# Patient Record
Sex: MALE | Race: WHITE | NOT HISPANIC OR LATINO | URBAN - METROPOLITAN AREA
[De-identification: names, ages, dates, MRNs, and addresses within clinical notes are randomized per-mention and may not be internally consistent; named-entity substitution may affect disease eponyms.]

---

## 2017-01-09 ENCOUNTER — OFFICE (OUTPATIENT)
Dept: URBAN - METROPOLITAN AREA CLINIC 64 | Facility: CLINIC | Age: 47
End: 2017-01-09

## 2017-01-09 VITALS
DIASTOLIC BLOOD PRESSURE: 94 MMHG | HEIGHT: 75 IN | SYSTOLIC BLOOD PRESSURE: 152 MMHG | WEIGHT: 261 LBS | HEART RATE: 66 BPM

## 2017-01-09 DIAGNOSIS — K86.1 OTHER CHRONIC PANCREATITIS: ICD-10-CM

## 2017-01-09 DIAGNOSIS — K85.90 ACUTE PANCREATITIS WITHOUT NECROSIS OR INFECTION, UNSPECIFIE: ICD-10-CM

## 2017-01-09 DIAGNOSIS — F10.10 ALCOHOL ABUSE, UNCOMPLICATED: ICD-10-CM

## 2017-01-09 DIAGNOSIS — R10.12 LEFT UPPER QUADRANT PAIN: ICD-10-CM

## 2017-01-09 DIAGNOSIS — R11.2 NAUSEA WITH VOMITING, UNSPECIFIED: ICD-10-CM

## 2017-01-09 DIAGNOSIS — K86.3 PSEUDOCYST OF PANCREAS: ICD-10-CM

## 2017-01-09 LAB
AMYLASE, SERUM: 53 U/L (ref 31–124)
C-REACTIVE PROTEIN, QUANT: 7 MG/L — HIGH (ref 0–4.9)
CBC WITH DIFFERENTIAL/PLATELET: BASO (ABSOLUTE): 0 X10E3/UL (ref 0–0.2)
CBC WITH DIFFERENTIAL/PLATELET: BASOS: 0 %
CBC WITH DIFFERENTIAL/PLATELET: EOS (ABSOLUTE): 0.2 X10E3/UL (ref 0–0.4)
CBC WITH DIFFERENTIAL/PLATELET: EOS: 2 %
CBC WITH DIFFERENTIAL/PLATELET: HEMATOCRIT: 40.8 % (ref 37.5–51)
CBC WITH DIFFERENTIAL/PLATELET: HEMATOLOGY COMMENTS: (no result)
CBC WITH DIFFERENTIAL/PLATELET: HEMOGLOBIN: 13.8 G/DL (ref 12.6–17.7)
CBC WITH DIFFERENTIAL/PLATELET: IMMATURE CELLS: (no result)
CBC WITH DIFFERENTIAL/PLATELET: IMMATURE GRANS (ABS): 0 X10E3/UL (ref 0–0.1)
CBC WITH DIFFERENTIAL/PLATELET: IMMATURE GRANULOCYTES: 0 %
CBC WITH DIFFERENTIAL/PLATELET: LYMPHS (ABSOLUTE): 3.3 X10E3/UL — HIGH (ref 0.7–3.1)
CBC WITH DIFFERENTIAL/PLATELET: LYMPHS: 31 %
CBC WITH DIFFERENTIAL/PLATELET: MCH: 33.3 PG — HIGH (ref 26.6–33)
CBC WITH DIFFERENTIAL/PLATELET: MCHC: 33.8 G/DL (ref 31.5–35.7)
CBC WITH DIFFERENTIAL/PLATELET: MCV: 98 FL — HIGH (ref 79–97)
CBC WITH DIFFERENTIAL/PLATELET: MONOCYTES(ABSOLUTE): 0.5 X10E3/UL (ref 0.1–0.9)
CBC WITH DIFFERENTIAL/PLATELET: MONOCYTES: 5 %
CBC WITH DIFFERENTIAL/PLATELET: NEUTROPHILS (ABSOLUTE): 6.4 X10E3/UL (ref 1.4–7)
CBC WITH DIFFERENTIAL/PLATELET: NEUTROPHILS: 62 %
CBC WITH DIFFERENTIAL/PLATELET: NRBC: (no result)
CBC WITH DIFFERENTIAL/PLATELET: PLATELETS: 271 X10E3/UL (ref 150–379)
CBC WITH DIFFERENTIAL/PLATELET: RBC: 4.15 X10E6/UL (ref 4.14–5.8)
CBC WITH DIFFERENTIAL/PLATELET: RDW: 15.8 % — HIGH (ref 12.3–15.4)
CBC WITH DIFFERENTIAL/PLATELET: WBC: 10.5 X10E3/UL (ref 3.4–10.8)
COMP. METABOLIC PANEL (14): A/G RATIO: 1.6 (ref 1.1–2.5)
COMP. METABOLIC PANEL (14): ALBUMIN, SERUM: 4.9 G/DL (ref 3.5–5.5)
COMP. METABOLIC PANEL (14): ALKALINE PHOSPHATASE, S: 63 IU/L (ref 39–117)
COMP. METABOLIC PANEL (14): ALT (SGPT): 47 IU/L — HIGH (ref 0–44)
COMP. METABOLIC PANEL (14): AST (SGOT): 119 IU/L — HIGH (ref 0–40)
COMP. METABOLIC PANEL (14): BILIRUBIN, TOTAL: <0.2 MG/DL
COMP. METABOLIC PANEL (14): BUN/CREATININE RATIO: 9 (ref 9–20)
COMP. METABOLIC PANEL (14): BUN: 9 MG/DL (ref 6–24)
COMP. METABOLIC PANEL (14): CALCIUM, SERUM: 9.7 MG/DL (ref 8.7–10.2)
COMP. METABOLIC PANEL (14): CARBON DIOXIDE, TOTAL: 24 MMOL/L (ref 18–29)
COMP. METABOLIC PANEL (14): CHLORIDE, SERUM: 97 MMOL/L (ref 96–106)
COMP. METABOLIC PANEL (14): CREATININE, SERUM: 1.03 MG/DL (ref 0.76–1.27)
COMP. METABOLIC PANEL (14): EGFR IF AFRICN AM: 100 ML/MIN/1.73 (ref 59–?)
COMP. METABOLIC PANEL (14): EGFR IF NONAFRICN AM: 87 ML/MIN/1.73 (ref 59–?)
COMP. METABOLIC PANEL (14): GLOBULIN, TOTAL: 3.1 G/DL (ref 1.5–4.5)
COMP. METABOLIC PANEL (14): GLUCOSE, SERUM: 92 MG/DL (ref 65–99)
COMP. METABOLIC PANEL (14): POTASSIUM, SERUM: 4.4 MMOL/L (ref 3.5–5.2)
COMP. METABOLIC PANEL (14): PROTEIN, TOTAL, SERUM: 8 G/DL (ref 6–8.5)
COMP. METABOLIC PANEL (14): SODIUM, SERUM: 140 MMOL/L (ref 134–144)
LIPASE, SERUM: 31 U/L (ref 0–59)

## 2017-01-09 PROCEDURE — 99213 OFFICE O/P EST LOW 20 MIN: CPT | Performed by: INTERNAL MEDICINE

## 2017-01-09 RX ORDER — DEXLANSOPRAZOLE 60 MG/1
CAPSULE, DELAYED RELEASE ORAL
Qty: 90 | Refills: 11 | Status: ACTIVE
Start: 2016-09-12

## 2017-01-09 RX ORDER — PROMETHAZINE HYDROCHLORIDE 25 MG/1
TABLET ORAL
Qty: 60 | Refills: 1 | Status: COMPLETED
Start: 2016-03-04 | End: 2020-07-02

## 2017-01-09 RX ORDER — TRAMADOL HYDROCHLORIDE 50 MG/1
200 TABLET ORAL
Qty: 30 | Refills: 0 | Status: COMPLETED
Start: 2015-11-23 | End: 2020-07-02

## 2017-01-09 RX ORDER — PANCRELIPASE 36000; 180000; 114000 [USP'U]/1; [USP'U]/1; [USP'U]/1
CAPSULE, DELAYED RELEASE PELLETS ORAL
Qty: 90 | Refills: 6 | Status: COMPLETED
Start: 2016-07-01 | End: 2020-07-02

## 2017-04-21 ENCOUNTER — OFFICE (OUTPATIENT)
Dept: URBAN - METROPOLITAN AREA CLINIC 64 | Facility: CLINIC | Age: 47
End: 2017-04-21

## 2017-04-21 VITALS
HEART RATE: 77 BPM | HEIGHT: 75 IN | DIASTOLIC BLOOD PRESSURE: 93 MMHG | SYSTOLIC BLOOD PRESSURE: 132 MMHG | WEIGHT: 256 LBS

## 2017-04-21 DIAGNOSIS — K86.1 OTHER CHRONIC PANCREATITIS: ICD-10-CM

## 2017-04-21 DIAGNOSIS — R11.2 NAUSEA WITH VOMITING, UNSPECIFIED: ICD-10-CM

## 2017-04-21 DIAGNOSIS — K60.2 ANAL FISSURE, UNSPECIFIED: ICD-10-CM

## 2017-04-21 PROCEDURE — 99213 OFFICE O/P EST LOW 20 MIN: CPT | Performed by: NURSE PRACTITIONER

## 2017-04-21 RX ORDER — TRAMADOL HYDROCHLORIDE 50 MG/1
200 TABLET ORAL
Qty: 30 | Refills: 0 | Status: COMPLETED
Start: 2015-11-23 | End: 2020-07-02

## 2017-04-21 RX ORDER — PANTOPRAZOLE SODIUM 40 MG/1
40 TABLET, DELAYED RELEASE ORAL
Qty: 90 | Refills: 4 | Status: COMPLETED
Start: 2017-01-09 | End: 2020-07-02

## 2017-05-30 ENCOUNTER — HOSPITAL ENCOUNTER (OUTPATIENT)
Dept: OTHER | Facility: HOSPITAL | Age: 47
Discharge: HOME | End: 2017-05-30
Attending: ORTHOPAEDIC SURGERY | Admitting: ORTHOPAEDIC SURGERY

## 2017-05-30 LAB
ANION GAP SERPL CALC-SCNC: 14.4 MMOL/L (ref 10–20)
BUN SERPL-MCNC: 16 MG/DL (ref 8–20)
BUN/CREAT SERPL: 12.3 (ref 6.2–20.3)
CALCIUM SERPL-MCNC: 9.6 MG/DL (ref 8.9–10.3)
CHLORIDE SERPL-SCNC: 101 MMOL/L (ref 101–111)
CONV CO2: 28 MMOL/L (ref 22–32)
CREAT UR-MCNC: 1.3 MG/DL (ref 0.7–1.2)
GLUCOSE SERPL-MCNC: 89 MG/DL (ref 65–99)
POTASSIUM SERPL-SCNC: 4.4 MMOL/L (ref 3.6–5.1)
SODIUM SERPL-SCNC: 139 MMOL/L (ref 136–144)

## 2017-08-14 ENCOUNTER — INPATIENT HOSPITAL (OUTPATIENT)
Dept: URBAN - METROPOLITAN AREA HOSPITAL 84 | Facility: HOSPITAL | Age: 47
End: 2017-08-14

## 2017-08-14 DIAGNOSIS — K86.1 OTHER CHRONIC PANCREATITIS: ICD-10-CM

## 2017-08-14 DIAGNOSIS — R94.5 ABNORMAL RESULTS OF LIVER FUNCTION STUDIES: ICD-10-CM

## 2017-08-14 DIAGNOSIS — D72.829 ELEVATED WHITE BLOOD CELL COUNT, UNSPECIFIED: ICD-10-CM

## 2017-08-14 PROCEDURE — 99223 1ST HOSP IP/OBS HIGH 75: CPT | Performed by: NURSE PRACTITIONER

## 2017-08-15 ENCOUNTER — INPATIENT HOSPITAL (OUTPATIENT)
Dept: URBAN - METROPOLITAN AREA HOSPITAL 84 | Facility: HOSPITAL | Age: 47
End: 2017-08-15

## 2017-08-15 DIAGNOSIS — K59.00 CONSTIPATION, UNSPECIFIED: ICD-10-CM

## 2017-08-15 DIAGNOSIS — K86.1 OTHER CHRONIC PANCREATITIS: ICD-10-CM

## 2017-08-15 PROCEDURE — 99231 SBSQ HOSP IP/OBS SF/LOW 25: CPT | Performed by: NURSE PRACTITIONER

## 2019-08-14 ENCOUNTER — HOSPITAL ENCOUNTER (EMERGENCY)
Facility: HOSPITAL | Age: 49
Discharge: HOME | End: 2019-08-14
Attending: EMERGENCY MEDICINE | Admitting: EMERGENCY MEDICINE

## 2019-08-14 VITALS
HEIGHT: 74 IN | BODY MASS INDEX: 28.35 KG/M2 | HEART RATE: 59 BPM | RESPIRATION RATE: 16 BRPM | WEIGHT: 220.9 LBS | TEMPERATURE: 97.3 F | SYSTOLIC BLOOD PRESSURE: 109 MMHG | OXYGEN SATURATION: 99 % | DIASTOLIC BLOOD PRESSURE: 65 MMHG

## 2019-08-14 DIAGNOSIS — R10.9 ABDOMINAL PAIN, UNSPECIFIED ABDOMINAL LOCATION: Primary | ICD-10-CM

## 2019-08-14 LAB
ALBUMIN SERPL-MCNC: 3.8 G/DL (ref 3.5–4.8)
ALBUMIN/GLOB SERPL: 1.2 G/DL (ref 1–1.7)
ALP SERPL-CCNC: 48 U/L (ref 32–91)
ALT SERPL W P-5'-P-CCNC: 22 U/L (ref 17–63)
ANION GAP SERPL CALCULATED.3IONS-SCNC: 14.7 MMOL/L (ref 5–15)
AST SERPL-CCNC: 41 U/L (ref 15–41)
BACTERIA UR QL AUTO: ABNORMAL /HPF
BASOPHILS # BLD AUTO: 0.1 10*3/MM3 (ref 0–0.2)
BASOPHILS NFR BLD AUTO: 1.4 % (ref 0–1.5)
BILIRUB SERPL-MCNC: 0.8 MG/DL (ref 0.3–1.2)
BILIRUB UR QL STRIP: NEGATIVE
BUN BLD-MCNC: 12 MG/DL (ref 8–20)
BUN/CREAT SERPL: 10.9 (ref 6.2–20.3)
CALCIUM SPEC-SCNC: 8.6 MG/DL (ref 8.9–10.3)
CHLORIDE SERPL-SCNC: 105 MMOL/L (ref 101–111)
CLARITY UR: CLEAR
CO2 SERPL-SCNC: 22 MMOL/L (ref 22–32)
COLOR UR: YELLOW
CREAT BLD-MCNC: 1.1 MG/DL (ref 0.7–1.2)
DEPRECATED RDW RBC AUTO: 54.3 FL (ref 37–54)
EOSINOPHIL # BLD AUTO: 0.3 10*3/MM3 (ref 0–0.4)
EOSINOPHIL NFR BLD AUTO: 3.1 % (ref 0.3–6.2)
ERYTHROCYTE [DISTWIDTH] IN BLOOD BY AUTOMATED COUNT: 15.5 % (ref 12.3–15.4)
GFR SERPL CREATININE-BSD FRML MDRD: 71 ML/MIN/1.73
GLOBULIN UR ELPH-MCNC: 3.1 GM/DL (ref 2.5–3.8)
GLUCOSE BLD-MCNC: 95 MG/DL (ref 65–99)
GLUCOSE UR STRIP-MCNC: NEGATIVE MG/DL
HCT VFR BLD AUTO: 43.2 % (ref 37.5–51)
HGB BLD-MCNC: 14.1 G/DL (ref 13–17.7)
HGB UR QL STRIP.AUTO: ABNORMAL
HYALINE CASTS UR QL AUTO: ABNORMAL /LPF
KETONES UR QL STRIP: NEGATIVE
LEUKOCYTE ESTERASE UR QL STRIP.AUTO: NEGATIVE
LIPASE SERPL-CCNC: 34 U/L (ref 22–51)
LYMPHOCYTES # BLD AUTO: 1.5 10*3/MM3 (ref 0.7–3.1)
LYMPHOCYTES NFR BLD AUTO: 18.7 % (ref 19.6–45.3)
MCH RBC QN AUTO: 32.7 PG (ref 26.6–33)
MCHC RBC AUTO-ENTMCNC: 32.6 G/DL (ref 31.5–35.7)
MCV RBC AUTO: 100.4 FL (ref 79–97)
MONOCYTES # BLD AUTO: 0.5 10*3/MM3 (ref 0.1–0.9)
MONOCYTES NFR BLD AUTO: 6.3 % (ref 5–12)
NEUTROPHILS # BLD AUTO: 5.7 10*3/MM3 (ref 1.7–7)
NEUTROPHILS NFR BLD AUTO: 70.5 % (ref 42.7–76)
NITRITE UR QL STRIP: NEGATIVE
NRBC BLD AUTO-RTO: 0.1 /100 WBC (ref 0–0.2)
PH UR STRIP.AUTO: 6 [PH] (ref 5–8)
PLATELET # BLD AUTO: 214 10*3/MM3 (ref 140–450)
PMV BLD AUTO: 9.1 FL (ref 6–12)
POTASSIUM BLD-SCNC: 4.7 MMOL/L (ref 3.6–5.1)
PROT SERPL-MCNC: 6.9 G/DL (ref 6.1–7.9)
PROT UR QL STRIP: NEGATIVE
RBC # BLD AUTO: 4.3 10*6/MM3 (ref 4.14–5.8)
RBC # UR: ABNORMAL /HPF
REF LAB TEST METHOD: ABNORMAL
SODIUM BLD-SCNC: 137 MMOL/L (ref 136–144)
SP GR UR STRIP: 1.01 (ref 1–1.03)
SQUAMOUS #/AREA URNS HPF: ABNORMAL /HPF
UROBILINOGEN UR QL STRIP: ABNORMAL
WBC NRBC COR # BLD: 8.2 10*3/MM3 (ref 3.4–10.8)
WBC UR QL AUTO: ABNORMAL /HPF

## 2019-08-14 PROCEDURE — 96374 THER/PROPH/DIAG INJ IV PUSH: CPT

## 2019-08-14 PROCEDURE — 25010000002 HYDROMORPHONE PER 4 MG: Performed by: EMERGENCY MEDICINE

## 2019-08-14 PROCEDURE — 80053 COMPREHEN METABOLIC PANEL: CPT | Performed by: EMERGENCY MEDICINE

## 2019-08-14 PROCEDURE — 99283 EMERGENCY DEPT VISIT LOW MDM: CPT

## 2019-08-14 PROCEDURE — 25010000002 ONDANSETRON PER 1 MG: Performed by: EMERGENCY MEDICINE

## 2019-08-14 PROCEDURE — 81001 URINALYSIS AUTO W/SCOPE: CPT | Performed by: EMERGENCY MEDICINE

## 2019-08-14 PROCEDURE — 83690 ASSAY OF LIPASE: CPT | Performed by: EMERGENCY MEDICINE

## 2019-08-14 PROCEDURE — 85025 COMPLETE CBC W/AUTO DIFF WBC: CPT | Performed by: EMERGENCY MEDICINE

## 2019-08-14 PROCEDURE — 96375 TX/PRO/DX INJ NEW DRUG ADDON: CPT

## 2019-08-14 RX ORDER — SODIUM CHLORIDE 0.9 % (FLUSH) 0.9 %
10 SYRINGE (ML) INJECTION AS NEEDED
Status: DISCONTINUED | OUTPATIENT
Start: 2019-08-14 | End: 2019-08-14 | Stop reason: HOSPADM

## 2019-08-14 RX ORDER — ONDANSETRON 2 MG/ML
4 INJECTION INTRAMUSCULAR; INTRAVENOUS ONCE
Status: COMPLETED | OUTPATIENT
Start: 2019-08-14 | End: 2019-08-14

## 2019-08-14 RX ORDER — HYDROMORPHONE HCL 110MG/55ML
0.5 PATIENT CONTROLLED ANALGESIA SYRINGE INTRAVENOUS ONCE
Status: COMPLETED | OUTPATIENT
Start: 2019-08-14 | End: 2019-08-14

## 2019-08-14 RX ADMIN — SODIUM CHLORIDE 1000 ML: 900 INJECTION, SOLUTION INTRAVENOUS at 12:02

## 2019-08-14 RX ADMIN — HYDROMORPHONE HYDROCHLORIDE 0.5 MG: 2 INJECTION, SOLUTION INTRAMUSCULAR; INTRAVENOUS; SUBCUTANEOUS at 12:02

## 2019-08-14 RX ADMIN — ONDANSETRON 4 MG: 2 INJECTION INTRAMUSCULAR; INTRAVENOUS at 12:02

## 2019-08-14 NOTE — DISCHARGE INSTRUCTIONS
Return for increased pain fever vomiting, shortness of air or any other concerns.  Follow-up with your doctor this week for reexamination

## 2019-08-14 NOTE — ED PROVIDER NOTES
"Subjective   History of Present Illness  Abdominal pain  49-year-old male states he felt nauseated and had some abdominal pain and muscle aches today.  He reports no fevers chills or unusual ingestions.  He states he has a history of chronic pancreatitis and was seen at [facility] yesterday.  Pain of moderate intensity without relieving or exacerbating factors described to be in the mid abdomen.  Review of Systems   Constitutional: Negative.    HENT: Negative.    Eyes: Negative.    Respiratory: Negative.    Cardiovascular: Negative.    Gastrointestinal: Positive for abdominal pain, nausea and vomiting. Negative for diarrhea.   Endocrine: Negative.    Genitourinary: Negative.    Musculoskeletal: Positive for myalgias.   Skin: Negative.    Neurological: Negative.    Psychiatric/Behavioral: Negative.        Past Medical History:   Diagnosis Date   • History of anal fissures    • Pancreatitis        Allergies   Allergen Reactions   • Penicillins Anaphylaxis       Past Surgical History:   Procedure Laterality Date   • KNEE ACL RECONSTRUCTION     • NECK SURGERY         History reviewed. No pertinent family history.    Social History     Socioeconomic History   • Marital status:      Spouse name: Not on file   • Number of children: Not on file   • Years of education: Not on file   • Highest education level: Not on file   Tobacco Use   • Smoking status: Current Every Day Smoker   • Smokeless tobacco: Never Used   Substance and Sexual Activity   • Alcohol use: No     Frequency: Never     Comment: Previous Daily/Binge drinker   • Drug use: No   • Sexual activity: Defer       /88 (BP Location: Left arm, Patient Position: Sitting)   Pulse 62   Temp 97.3 °F (36.3 °C) (Oral)   Resp 19   Ht 188 cm (74\")   Wt 100 kg (220 lb 14.4 oz)   SpO2 99%   BMI 28.36 kg/m²       Objective   Physical Exam  General: Well-developed well-appearing, no acute distress, alert and appropriate  Eyes: Pupils round and reactive, " sclera nonicteric  HEENT: Mucous membranes moist, no mucosal swelling  Neck: Supple, no nuchal rigidity, no lymphadenopathy  Respirations: Respirations nonlabored, equal breath sounds bilaterally, clear lungs  Heart regular rate and rhythm, no murmurs rubs or gallops,   Abdomen soft mildly tender palpation mid abdomen, no rebound or guarding, nondistended, no hepatosplenomegaly, no hernia, no mass, normal bowel sounds, no CVA tenderness  Extremities no clubbing cyanosis or edema, calves are symmetric and nontender  Neuro cranial nerves grossly intact, no focal limb deficits  Psych oriented, pleasant affect  Skin no rash, brisk cap refill  Procedures           ED Course      Results for orders placed or performed during the hospital encounter of 08/14/19   Comprehensive Metabolic Panel   Result Value Ref Range    Glucose 95 65 - 99 mg/dL    BUN 12 8 - 20 mg/dL    Creatinine 1.10 0.70 - 1.20 mg/dL    Sodium 137 136 - 144 mmol/L    Potassium 4.7 3.6 - 5.1 mmol/L    Chloride 105 101 - 111 mmol/L    CO2 22.0 22.0 - 32.0 mmol/L    Calcium 8.6 (L) 8.9 - 10.3 mg/dL    Total Protein 6.9 6.1 - 7.9 g/dL    Albumin 3.80 3.50 - 4.80 g/dL    ALT (SGPT) 22 17 - 63 U/L    AST (SGOT) 41 15 - 41 U/L    Alkaline Phosphatase 48 32 - 91 U/L    Total Bilirubin 0.8 0.3 - 1.2 mg/dL    eGFR Non African Amer 71 >60 mL/min/1.73    Globulin 3.1 2.5 - 3.8 gm/dL    A/G Ratio 1.2 1.0 - 1.7 g/dL    BUN/Creatinine Ratio 10.9 6.2 - 20.3    Anion Gap 14.7 5.0 - 15.0 mmol/L   Lipase   Result Value Ref Range    Lipase 34 22 - 51 U/L   Urinalysis With Culture If Indicated - Urine, Clean Catch   Result Value Ref Range    Color, UA Yellow Yellow, Straw    Appearance, UA Clear Clear    pH, UA 6.0 5.0 - 8.0    Specific Gravity, UA 1.012 1.005 - 1.030    Glucose, UA Negative Negative    Ketones, UA Negative Negative    Bilirubin, UA Negative Negative    Blood, UA Small (1+) (A) Negative    Protein, UA Negative Negative    Leuk Esterase, UA Negative Negative     Nitrite, UA Negative Negative    Urobilinogen, UA 0.2 E.U./dL 0.2 - 1.0 E.U./dL   CBC Auto Differential   Result Value Ref Range    WBC 8.20 3.40 - 10.80 10*3/mm3    RBC 4.30 4.14 - 5.80 10*6/mm3    Hemoglobin 14.1 13.0 - 17.7 g/dL    Hematocrit 43.2 37.5 - 51.0 %    .4 (H) 79.0 - 97.0 fL    MCH 32.7 26.6 - 33.0 pg    MCHC 32.6 31.5 - 35.7 g/dL    RDW 15.5 (H) 12.3 - 15.4 %    RDW-SD 54.3 (H) 37.0 - 54.0 fl    MPV 9.1 6.0 - 12.0 fL    Platelets 214 140 - 450 10*3/mm3    Neutrophil % 70.5 42.7 - 76.0 %    Lymphocyte % 18.7 (L) 19.6 - 45.3 %    Monocyte % 6.3 5.0 - 12.0 %    Eosinophil % 3.1 0.3 - 6.2 %    Basophil % 1.4 0.0 - 1.5 %    Neutrophils, Absolute 5.70 1.70 - 7.00 10*3/mm3    Lymphocytes, Absolute 1.50 0.70 - 3.10 10*3/mm3    Monocytes, Absolute 0.50 0.10 - 0.90 10*3/mm3    Eosinophils, Absolute 0.30 0.00 - 0.40 10*3/mm3    Basophils, Absolute 0.10 0.00 - 0.20 10*3/mm3    nRBC 0.1 0.0 - 0.2 /100 WBC   Urinalysis, Microscopic Only - Urine, Clean Catch   Result Value Ref Range    RBC, UA 0-2 (A) None Seen /HPF    WBC, UA None Seen None Seen /HPF    Bacteria, UA None Seen None Seen /HPF    Squamous Epithelial Cells, UA None Seen None Seen, 0-2 /HPF    Hyaline Casts, UA None Seen None Seen /LPF    Methodology Automated Microscopy                  MDM  Patient presents with nonspecific abdominal pain with a history of chronic pink otitis.  He has benign abdominal examination with no signs of peritonitis or acute abdomen.  Laboratory evaluation was essentially normal.  Patient was given pain nausea medication IV fluids and was resting comfortably reexamination.  He is discharged in good condition and given warning signs for return.    Final diagnoses:   Abdominal pain, unspecified abdominal location            Cody Davenport MD  08/14/19 1153       Cody Davenport MD  08/14/19 1305

## 2019-08-14 NOTE — ED NOTES
Pt c/o stomach feeling bloated starting yesterday. Pt was told it was pancreatitis by [facility] yesterday with prescription for Zofran. This morning pt states he had 2 bowel movements and feels he tore rectum due to significant amount of blood in stool. Pt has hx of fissures in rectum. Pt also c/o feeling lethargic and dizzy since this morning.      Prior, BONITA Perez  08/14/19 3357

## 2019-08-19 ENCOUNTER — ON CAMPUS - OUTPATIENT (OUTPATIENT)
Dept: URBAN - METROPOLITAN AREA HOSPITAL 77 | Facility: HOSPITAL | Age: 49
End: 2019-08-19
Payer: COMMERCIAL

## 2019-08-19 DIAGNOSIS — K29.80 DUODENITIS WITHOUT BLEEDING: ICD-10-CM

## 2019-08-19 DIAGNOSIS — R93.3 ABNORMAL FINDINGS ON DIAGNOSTIC IMAGING OF OTHER PARTS OF DI: ICD-10-CM

## 2019-08-19 DIAGNOSIS — R13.10 DYSPHAGIA, UNSPECIFIED: ICD-10-CM

## 2019-08-19 DIAGNOSIS — K20.8 OTHER ESOPHAGITIS: ICD-10-CM

## 2019-08-19 DIAGNOSIS — R10.13 EPIGASTRIC PAIN: ICD-10-CM

## 2019-08-19 DIAGNOSIS — K31.89 OTHER DISEASES OF STOMACH AND DUODENUM: ICD-10-CM

## 2019-08-19 DIAGNOSIS — K29.50 UNSPECIFIED CHRONIC GASTRITIS WITHOUT BLEEDING: ICD-10-CM

## 2019-08-19 PROCEDURE — 43450 DILATE ESOPHAGUS 1/MULT PASS: CPT | Performed by: INTERNAL MEDICINE

## 2019-08-19 PROCEDURE — 43239 EGD BIOPSY SINGLE/MULTIPLE: CPT | Performed by: INTERNAL MEDICINE

## 2019-08-20 ENCOUNTER — ON CAMPUS - OUTPATIENT (OUTPATIENT)
Dept: URBAN - METROPOLITAN AREA HOSPITAL 77 | Facility: HOSPITAL | Age: 49
End: 2019-08-20

## 2019-08-20 DIAGNOSIS — R10.13 EPIGASTRIC PAIN: ICD-10-CM

## 2019-08-20 DIAGNOSIS — R74.8 ABNORMAL LEVELS OF OTHER SERUM ENZYMES: ICD-10-CM

## 2019-08-20 DIAGNOSIS — K29.80 DUODENITIS WITHOUT BLEEDING: ICD-10-CM

## 2019-08-20 DIAGNOSIS — K20.8 OTHER ESOPHAGITIS: ICD-10-CM

## 2019-08-20 DIAGNOSIS — K86.1 OTHER CHRONIC PANCREATITIS: ICD-10-CM

## 2019-08-20 DIAGNOSIS — R11.0 NAUSEA: ICD-10-CM

## 2019-08-20 DIAGNOSIS — K29.50 UNSPECIFIED CHRONIC GASTRITIS WITHOUT BLEEDING: ICD-10-CM

## 2019-08-20 DIAGNOSIS — R13.10 DYSPHAGIA, UNSPECIFIED: ICD-10-CM

## 2019-08-20 PROCEDURE — 99212 OFFICE O/P EST SF 10 MIN: CPT | Performed by: NURSE PRACTITIONER

## 2019-09-30 ENCOUNTER — ON CAMPUS - OUTPATIENT (OUTPATIENT)
Dept: URBAN - METROPOLITAN AREA HOSPITAL 77 | Facility: HOSPITAL | Age: 49
End: 2019-09-30
Payer: COMMERCIAL

## 2019-09-30 DIAGNOSIS — F10.188 ALCOHOL ABUSE WITH OTHER ALCOHOL-INDUCED DISORDER: ICD-10-CM

## 2019-09-30 DIAGNOSIS — K86.0 ALCOHOL-INDUCED CHRONIC PANCREATITIS: ICD-10-CM

## 2019-09-30 DIAGNOSIS — R94.5 ABNORMAL RESULTS OF LIVER FUNCTION STUDIES: ICD-10-CM

## 2019-09-30 DIAGNOSIS — K31.89 OTHER DISEASES OF STOMACH AND DUODENUM: ICD-10-CM

## 2019-09-30 PROCEDURE — 43238 EGD US FINE NEEDLE BX/ASPIR: CPT | Performed by: INTERNAL MEDICINE

## 2019-10-08 ENCOUNTER — HOSPITAL ENCOUNTER (OUTPATIENT)
Facility: HOSPITAL | Age: 49
Setting detail: OBSERVATION
Discharge: HOME | End: 2019-10-09
Attending: INTERNAL MEDICINE | Admitting: HOSPITALIST

## 2019-10-08 ENCOUNTER — APPOINTMENT (OUTPATIENT)
Dept: CT IMAGING | Facility: HOSPITAL | Age: 49
End: 2019-10-08

## 2019-10-08 DIAGNOSIS — R10.10 PAIN OF UPPER ABDOMEN: Primary | ICD-10-CM

## 2019-10-08 DIAGNOSIS — G89.29 CHRONIC CHEST PAIN: ICD-10-CM

## 2019-10-08 DIAGNOSIS — K92.1 HEMATOCHEZIA: ICD-10-CM

## 2019-10-08 DIAGNOSIS — R11.0 NAUSEA: ICD-10-CM

## 2019-10-08 DIAGNOSIS — R06.09 CHRONIC DYSPNEA: ICD-10-CM

## 2019-10-08 DIAGNOSIS — R07.9 CHRONIC CHEST PAIN: ICD-10-CM

## 2019-10-08 LAB
ALBUMIN SERPL-MCNC: 3.8 G/DL (ref 3.5–4.8)
ALBUMIN/GLOB SERPL: 1.2 G/DL (ref 1–1.7)
ALP SERPL-CCNC: 54 U/L (ref 32–91)
ALT SERPL W P-5'-P-CCNC: 14 U/L (ref 17–63)
ANION GAP SERPL CALCULATED.3IONS-SCNC: 13.9 MMOL/L (ref 5–15)
AST SERPL-CCNC: 20 U/L (ref 15–41)
BASOPHILS # BLD AUTO: 0.2 10*3/MM3 (ref 0–0.2)
BASOPHILS NFR BLD AUTO: 1.5 % (ref 0–1.5)
BILIRUB SERPL-MCNC: 0.5 MG/DL (ref 0.3–1.2)
BUN BLD-MCNC: 12 MG/DL (ref 8–20)
BUN/CREAT SERPL: 15 (ref 6.2–20.3)
CALCIUM SPEC-SCNC: 8.8 MG/DL (ref 8.9–10.3)
CHLORIDE SERPL-SCNC: 104 MMOL/L (ref 101–111)
CO2 SERPL-SCNC: 24 MMOL/L (ref 22–32)
CREAT BLD-MCNC: 0.8 MG/DL (ref 0.7–1.2)
DEPRECATED RDW RBC AUTO: 47.3 FL (ref 37–54)
EOSINOPHIL # BLD AUTO: 0.2 10*3/MM3 (ref 0–0.4)
EOSINOPHIL NFR BLD AUTO: 2.4 % (ref 0.3–6.2)
ERYTHROCYTE [DISTWIDTH] IN BLOOD BY AUTOMATED COUNT: 13.8 % (ref 12.3–15.4)
GFR SERPL CREATININE-BSD FRML MDRD: 103 ML/MIN/1.73
GLOBULIN UR ELPH-MCNC: 3.1 GM/DL (ref 2.5–3.8)
GLUCOSE BLD-MCNC: 114 MG/DL (ref 65–99)
HCT VFR BLD AUTO: 39.7 % (ref 37.5–51)
HGB BLD-MCNC: 13.8 G/DL (ref 13–17.7)
LIPASE SERPL-CCNC: 39 U/L (ref 22–51)
LYMPHOCYTES # BLD AUTO: 3.2 10*3/MM3 (ref 0.7–3.1)
LYMPHOCYTES NFR BLD AUTO: 31.6 % (ref 19.6–45.3)
MCH RBC QN AUTO: 34.2 PG (ref 26.6–33)
MCHC RBC AUTO-ENTMCNC: 34.9 G/DL (ref 31.5–35.7)
MCV RBC AUTO: 98 FL (ref 79–97)
MONOCYTES # BLD AUTO: 0.6 10*3/MM3 (ref 0.1–0.9)
MONOCYTES NFR BLD AUTO: 6 % (ref 5–12)
NEUTROPHILS # BLD AUTO: 6 10*3/MM3 (ref 1.7–7)
NEUTROPHILS NFR BLD AUTO: 58.5 % (ref 42.7–76)
NRBC BLD AUTO-RTO: 0.1 /100 WBC (ref 0–0.2)
PLATELET # BLD AUTO: 252 10*3/MM3 (ref 140–450)
PMV BLD AUTO: 8.3 FL (ref 6–12)
POTASSIUM BLD-SCNC: 3.9 MMOL/L (ref 3.6–5.1)
PROT SERPL-MCNC: 6.9 G/DL (ref 6.1–7.9)
RBC # BLD AUTO: 4.05 10*6/MM3 (ref 4.14–5.8)
SODIUM BLD-SCNC: 138 MMOL/L (ref 136–144)
WBC NRBC COR # BLD: 10.3 10*3/MM3 (ref 3.4–10.8)

## 2019-10-08 PROCEDURE — 96376 TX/PRO/DX INJ SAME DRUG ADON: CPT

## 2019-10-08 PROCEDURE — 80053 COMPREHEN METABOLIC PANEL: CPT | Performed by: NURSE PRACTITIONER

## 2019-10-08 PROCEDURE — 83690 ASSAY OF LIPASE: CPT | Performed by: NURSE PRACTITIONER

## 2019-10-08 PROCEDURE — 96375 TX/PRO/DX INJ NEW DRUG ADDON: CPT

## 2019-10-08 PROCEDURE — 96374 THER/PROPH/DIAG INJ IV PUSH: CPT

## 2019-10-08 PROCEDURE — 85025 COMPLETE CBC W/AUTO DIFF WBC: CPT | Performed by: NURSE PRACTITIONER

## 2019-10-08 PROCEDURE — 0 IOPAMIDOL PER 1 ML: Performed by: NURSE PRACTITIONER

## 2019-10-08 PROCEDURE — 25010000002 ONDANSETRON PER 1 MG: Performed by: NURSE PRACTITIONER

## 2019-10-08 PROCEDURE — 93005 ELECTROCARDIOGRAM TRACING: CPT | Performed by: NURSE PRACTITIONER

## 2019-10-08 PROCEDURE — 25010000002 HYDROMORPHONE PER 4 MG: Performed by: NURSE PRACTITIONER

## 2019-10-08 PROCEDURE — 25010000002 HYDROMORPHONE PER 4 MG

## 2019-10-08 PROCEDURE — 74177 CT ABD & PELVIS W/CONTRAST: CPT

## 2019-10-08 PROCEDURE — 99285 EMERGENCY DEPT VISIT HI MDM: CPT

## 2019-10-08 RX ORDER — LIDOCAINE HYDROCHLORIDE 20 MG/ML
15 SOLUTION OROPHARYNGEAL ONCE
Status: COMPLETED | OUTPATIENT
Start: 2019-10-08 | End: 2019-10-09

## 2019-10-08 RX ORDER — HYDROMORPHONE HCL 110MG/55ML
1 PATIENT CONTROLLED ANALGESIA SYRINGE INTRAVENOUS ONCE
Status: COMPLETED | OUTPATIENT
Start: 2019-10-08 | End: 2019-10-08

## 2019-10-08 RX ORDER — ALUMINA, MAGNESIA, AND SIMETHICONE 2400; 2400; 240 MG/30ML; MG/30ML; MG/30ML
15 SUSPENSION ORAL ONCE
Status: COMPLETED | OUTPATIENT
Start: 2019-10-08 | End: 2019-10-09

## 2019-10-08 RX ORDER — HYDROMORPHONE HCL 110MG/55ML
PATIENT CONTROLLED ANALGESIA SYRINGE INTRAVENOUS
Status: COMPLETED
Start: 2019-10-08 | End: 2019-10-08

## 2019-10-08 RX ORDER — SODIUM CHLORIDE 0.9 % (FLUSH) 0.9 %
10 SYRINGE (ML) INJECTION AS NEEDED
Status: DISCONTINUED | OUTPATIENT
Start: 2019-10-08 | End: 2019-10-09 | Stop reason: HOSPADM

## 2019-10-08 RX ORDER — ONDANSETRON 2 MG/ML
4 INJECTION INTRAMUSCULAR; INTRAVENOUS ONCE
Status: COMPLETED | OUTPATIENT
Start: 2019-10-08 | End: 2019-10-08

## 2019-10-08 RX ADMIN — HYDROMORPHONE HYDROCHLORIDE 1 MG: 2 INJECTION, SOLUTION INTRAMUSCULAR; INTRAVENOUS; SUBCUTANEOUS at 20:59

## 2019-10-08 RX ADMIN — SODIUM CHLORIDE 500 ML: 900 INJECTION, SOLUTION INTRAVENOUS at 21:00

## 2019-10-08 RX ADMIN — ONDANSETRON 4 MG: 2 INJECTION INTRAMUSCULAR; INTRAVENOUS at 20:59

## 2019-10-08 RX ADMIN — HYDROMORPHONE HYDROCHLORIDE 1 MG: 2 INJECTION, SOLUTION INTRAMUSCULAR; INTRAVENOUS; SUBCUTANEOUS at 22:08

## 2019-10-08 RX ADMIN — Medication 1 MG: at 22:08

## 2019-10-08 RX ADMIN — IOPAMIDOL 100 ML: 755 INJECTION, SOLUTION INTRAVENOUS at 21:38

## 2019-10-09 VITALS
WEIGHT: 218.7 LBS | SYSTOLIC BLOOD PRESSURE: 112 MMHG | OXYGEN SATURATION: 96 % | TEMPERATURE: 97.7 F | BODY MASS INDEX: 28.07 KG/M2 | DIASTOLIC BLOOD PRESSURE: 70 MMHG | RESPIRATION RATE: 20 BRPM | HEIGHT: 74 IN | HEART RATE: 76 BPM

## 2019-10-09 PROBLEM — R10.10 PAIN OF UPPER ABDOMEN: Status: ACTIVE | Noted: 2019-10-09

## 2019-10-09 LAB
ARTICHOKE IGE QN: 94 MG/DL (ref 0–100)
CHOLEST SERPL-MCNC: 131 MG/DL
D DIMER PPP FEU-MCNC: 1.74 MCGFEU/ML (ref 0.17–0.59)
HDLC SERPL QL: 4.52
HDLC SERPL-MCNC: 29 MG/DL
LDLC/HDLC SERPL: 2.49 {RATIO}
TRIGL SERPL-MCNC: 149 MG/DL
TROPONIN I SERPL-MCNC: <0.03 NG/ML (ref 0–0.03)
VLDLC SERPL-MCNC: 29.8 MG/DL

## 2019-10-09 PROCEDURE — 99217 PR OBSERVATION CARE DISCHARGE MANAGEMENT: CPT | Performed by: HOSPITALIST

## 2019-10-09 PROCEDURE — G0378 HOSPITAL OBSERVATION PER HR: HCPCS

## 2019-10-09 PROCEDURE — 25810000003 SODIUM CHLORIDE 0.9 % WITH KCL 20 MEQ 20-0.9 MEQ/L-% SOLUTION: Performed by: NURSE PRACTITIONER

## 2019-10-09 PROCEDURE — 84484 ASSAY OF TROPONIN QUANT: CPT | Performed by: NURSE PRACTITIONER

## 2019-10-09 PROCEDURE — 80061 LIPID PANEL: CPT | Performed by: NURSE PRACTITIONER

## 2019-10-09 PROCEDURE — 85379 FIBRIN DEGRADATION QUANT: CPT | Performed by: NURSE PRACTITIONER

## 2019-10-09 PROCEDURE — 99219 PR INITIAL OBSERVATION CARE/DAY 50 MINUTES: CPT | Performed by: NURSE PRACTITIONER

## 2019-10-09 PROCEDURE — 96361 HYDRATE IV INFUSION ADD-ON: CPT

## 2019-10-09 RX ORDER — METHOCARBAMOL 750 MG/1
750 TABLET, FILM COATED ORAL 2 TIMES DAILY
COMMUNITY
End: 2020-06-11

## 2019-10-09 RX ORDER — LIDOCAINE 50 MG/G
1 PATCH TOPICAL EVERY 24 HOURS
COMMUNITY
End: 2020-06-11

## 2019-10-09 RX ORDER — ACETAMINOPHEN 325 MG/1
650 TABLET ORAL EVERY 4 HOURS PRN
Status: DISCONTINUED | OUTPATIENT
Start: 2019-10-09 | End: 2019-10-09 | Stop reason: HOSPADM

## 2019-10-09 RX ORDER — ONDANSETRON 4 MG/1
4 TABLET, FILM COATED ORAL EVERY 8 HOURS PRN
COMMUNITY
End: 2020-06-11

## 2019-10-09 RX ORDER — PROMETHAZINE HYDROCHLORIDE 25 MG/1
25 TABLET ORAL EVERY 6 HOURS PRN
Status: DISCONTINUED | OUTPATIENT
Start: 2019-10-09 | End: 2019-10-09 | Stop reason: HOSPADM

## 2019-10-09 RX ORDER — LIDOCAINE 50 MG/G
1 PATCH TOPICAL EVERY 24 HOURS
Status: DISCONTINUED | OUTPATIENT
Start: 2019-10-09 | End: 2019-10-09 | Stop reason: HOSPADM

## 2019-10-09 RX ORDER — PROMETHAZINE HYDROCHLORIDE 25 MG/1
25 TABLET ORAL EVERY 6 HOURS PRN
COMMUNITY
End: 2020-06-11

## 2019-10-09 RX ORDER — HYDROCODONE BITARTRATE AND ACETAMINOPHEN 10; 325 MG/1; MG/1
1 TABLET ORAL EVERY 4 HOURS PRN
Status: DISCONTINUED | OUTPATIENT
Start: 2019-10-09 | End: 2019-10-09 | Stop reason: HOSPADM

## 2019-10-09 RX ORDER — SODIUM CHLORIDE 0.9 % (FLUSH) 0.9 %
10 SYRINGE (ML) INJECTION AS NEEDED
Status: DISCONTINUED | OUTPATIENT
Start: 2019-10-09 | End: 2019-10-09 | Stop reason: HOSPADM

## 2019-10-09 RX ORDER — HYDROCODONE BITARTRATE AND ACETAMINOPHEN 7.5; 325 MG/1; MG/1
1 TABLET ORAL ONCE
Status: COMPLETED | OUTPATIENT
Start: 2019-10-09 | End: 2019-10-09

## 2019-10-09 RX ORDER — PANTOPRAZOLE SODIUM 40 MG/1
40 TABLET, DELAYED RELEASE ORAL DAILY
COMMUNITY
End: 2020-06-11

## 2019-10-09 RX ORDER — ONDANSETRON 4 MG/1
4 TABLET, FILM COATED ORAL EVERY 8 HOURS PRN
Status: DISCONTINUED | OUTPATIENT
Start: 2019-10-09 | End: 2019-10-09 | Stop reason: SDUPTHER

## 2019-10-09 RX ORDER — SODIUM CHLORIDE 9 MG/ML
100 INJECTION, SOLUTION INTRAVENOUS CONTINUOUS
Status: DISCONTINUED | OUTPATIENT
Start: 2019-10-09 | End: 2019-10-09

## 2019-10-09 RX ORDER — SODIUM CHLORIDE AND POTASSIUM CHLORIDE 150; 900 MG/100ML; MG/100ML
125 INJECTION, SOLUTION INTRAVENOUS CONTINUOUS
Status: DISCONTINUED | OUTPATIENT
Start: 2019-10-09 | End: 2019-10-09 | Stop reason: HOSPADM

## 2019-10-09 RX ORDER — ACETAMINOPHEN 650 MG/1
650 SUPPOSITORY RECTAL EVERY 4 HOURS PRN
Status: DISCONTINUED | OUTPATIENT
Start: 2019-10-09 | End: 2019-10-09 | Stop reason: HOSPADM

## 2019-10-09 RX ORDER — METHOCARBAMOL 500 MG/1
750 TABLET, FILM COATED ORAL 2 TIMES DAILY
Status: DISCONTINUED | OUTPATIENT
Start: 2019-10-09 | End: 2019-10-09 | Stop reason: HOSPADM

## 2019-10-09 RX ORDER — PANTOPRAZOLE SODIUM 40 MG/1
40 TABLET, DELAYED RELEASE ORAL DAILY
Status: DISCONTINUED | OUTPATIENT
Start: 2019-10-09 | End: 2019-10-09 | Stop reason: HOSPADM

## 2019-10-09 RX ORDER — ONDANSETRON 4 MG/1
4 TABLET, FILM COATED ORAL EVERY 6 HOURS PRN
Status: DISCONTINUED | OUTPATIENT
Start: 2019-10-09 | End: 2019-10-09 | Stop reason: HOSPADM

## 2019-10-09 RX ORDER — LEVOTHYROXINE SODIUM 0.1 MG/1
125 TABLET ORAL DAILY
COMMUNITY

## 2019-10-09 RX ORDER — ACETAMINOPHEN 160 MG/5ML
650 SOLUTION ORAL EVERY 4 HOURS PRN
Status: DISCONTINUED | OUTPATIENT
Start: 2019-10-09 | End: 2019-10-09 | Stop reason: HOSPADM

## 2019-10-09 RX ORDER — ONDANSETRON 2 MG/ML
4 INJECTION INTRAMUSCULAR; INTRAVENOUS EVERY 6 HOURS PRN
Status: DISCONTINUED | OUTPATIENT
Start: 2019-10-09 | End: 2019-10-09 | Stop reason: HOSPADM

## 2019-10-09 RX ORDER — SODIUM CHLORIDE 0.9 % (FLUSH) 0.9 %
10 SYRINGE (ML) INJECTION EVERY 12 HOURS SCHEDULED
Status: DISCONTINUED | OUTPATIENT
Start: 2019-10-09 | End: 2019-10-09 | Stop reason: HOSPADM

## 2019-10-09 RX ADMIN — Medication 10 ML: at 08:54

## 2019-10-09 RX ADMIN — PANTOPRAZOLE SODIUM 40 MG: 40 TABLET, DELAYED RELEASE ORAL at 08:53

## 2019-10-09 RX ADMIN — SODIUM CHLORIDE AND POTASSIUM CHLORIDE 125 ML/HR: 9; 1.49 INJECTION, SOLUTION INTRAVENOUS at 08:52

## 2019-10-09 RX ADMIN — HYDROCODONE BITARTRATE AND ACETAMINOPHEN 1 TABLET: 10; 325 TABLET ORAL at 08:53

## 2019-10-09 RX ADMIN — METHOCARBAMOL TABLETS 750 MG: 500 TABLET, COATED ORAL at 08:53

## 2019-10-09 RX ADMIN — SODIUM CHLORIDE 100 ML/HR: 900 INJECTION, SOLUTION INTRAVENOUS at 05:28

## 2019-10-09 RX ADMIN — HYDROCODONE BITARTRATE AND ACETAMINOPHEN 1 TABLET: 7.5; 325 TABLET ORAL at 03:49

## 2019-10-09 RX ADMIN — ALUMINUM HYDROXIDE, MAGNESIUM HYDROXIDE, AND DIMETHICONE 15 ML: 400; 400; 40 SUSPENSION ORAL at 00:01

## 2019-10-09 RX ADMIN — LIDOCAINE HYDROCHLORIDE 15 ML: 20 SOLUTION ORAL; TOPICAL at 00:02

## 2019-10-09 NOTE — ED PROVIDER NOTES
Subjective   Context: 49-year-old male patient presents the ER with complaint of upper abdominal pain; patient reports onset of symptoms that began prior to eating dinner tonight, he reports that after he ate dinner that it became worse.  He states he has a history of pancreatitis and reports that it feels the same.  Patient reports a recent admission to [facility] last week for acute on chronic pancreatitis, he states he was seen by his GI physician and had an EGD with diagnosis of a cyst in his abdomen.  He states that after he symptoms that his abdomen felt very bloated, he reports that he went to the bathroom and passed a bloody stool.  He denies anal discomfort or trauma.  No fever or chills.  He reports nausea without vomiting.  Denies direct injury or trauma.  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    Location: upper abd  Quality:pressure, tight  Timin hrs ago  Duration: constant  Aggravating:eating  Alleviating:  nothing    PCP:  GI:  Anthony            Review of Systems   Constitutional: Negative for chills and fever.   HENT: Negative for rhinorrhea, sore throat, trouble swallowing and voice change.    Eyes: Negative for photophobia and visual disturbance.   Respiratory: Negative for cough, choking, chest tightness and shortness of breath.    Cardiovascular: Negative for chest pain, palpitations and leg swelling.   Gastrointestinal: Positive for abdominal pain, blood in stool and nausea. Negative for abdominal distention, constipation, diarrhea and vomiting.   Genitourinary: Negative for decreased urine volume, difficulty urinating, discharge, flank pain, frequency, hematuria, penile pain, scrotal swelling, testicular pain and urgency.   Musculoskeletal: Negative for arthralgias, back pain and myalgias.   Skin: Negative for color change, rash and wound.   Neurological: Negative for dizziness, syncope, light-headedness, numbness and  headaches.   Hematological: Does not bruise/bleed easily.       Prior to Admission medications    Not on File           Past Medical History:   Diagnosis Date   • History of anal fissures    • Pancreatitis        Allergies   Allergen Reactions   • Penicillins Anaphylaxis   • Ibuprofen GI Bleeding   • Cyclobenzaprine Rash       Past Surgical History:   Procedure Laterality Date   • KNEE ACL RECONSTRUCTION     • NECK SURGERY         No family history on file.    Social History     Socioeconomic History   • Marital status:      Spouse name: Not on file   • Number of children: Not on file   • Years of education: Not on file   • Highest education level: Not on file   Tobacco Use   • Smoking status: Current Every Day Smoker   • Smokeless tobacco: Never Used   Substance and Sexual Activity   • Alcohol use: No     Frequency: Never     Comment: Previous Daily/Binge drinker   • Drug use: No   • Sexual activity: Defer           Objective   Physical Exam       Vital signs and triage nurse note reviewed.   Constitutional: Awake, alert; well-developed and well-nourished. No acute distress is noted.   HEENT: Normocephalic, atraumatic; pupils are PERRL with intact EOM; oropharynx is pink and moist without exudate or erythema.   Neck: Supple, full range of motion without pain; no cervical lymphadenopathy.   Cardiovascular: Regular rate and rhythm, normal S1-S2.   Pulmonary: Respiratory effort regular nonlabored, breath sounds clear to auscultation all fields.   Abdomen: Soft, rounded, there is generalized tenderness across the upper abdomen, no organomegaly or pulsatile mass, lower abd nontender nondistended with hypoactive bowel sounds; no rebound or guarding.  Perianal tissues are symmetrical with no erythema no active bleeding, no fissures, rectal examination shows no laxity good rectal sensation, minimal amount of stool is noted in the ampulla, it is dark brown in color and heme positive   Musculoskeletal: Independent  range of motion of all extremities with no palpable tenderness or edema.  Negative Homans   Neuro: Alert oriented x3, speech is clear and appropriate, GCS 15   Skin:  Fleshtone warm, dry, intact; no erythematous or petechial rash or lesion    Procedures           ED Course        Ct Abdomen Pelvis With Contrast    Result Date: 10/8/2019  1. There is evidence of mild acute uncomplicated interstitial edematous pancreatitis involving the head of the pancreas. Pancreatitis was also reported on CT of 08/13/2017; on today's study it looks lesser in severity and extent. 2. There is a 2 cm lobular fluid density structure in the inferior aspect of the body the stomach which could be in the wall. This could possibly be a duplication cyst. It is not significantly changed from 2017.  Electronically Signed By-Melissa Eastman On:10/8/2019 10:20 PM This report was finalized on [id number] by  Melissa Eastman, .    Results for orders placed or performed during the hospital encounter of 10/08/19   Comprehensive Metabolic Panel   Result Value Ref Range    Glucose 114 (H) 65 - 99 mg/dL    BUN 12 8 - 20 mg/dL    Creatinine 0.80 0.70 - 1.20 mg/dL    Sodium 138 136 - 144 mmol/L    Potassium 3.9 3.6 - 5.1 mmol/L    Chloride 104 101 - 111 mmol/L    CO2 24.0 22.0 - 32.0 mmol/L    Calcium 8.8 (L) 8.9 - 10.3 mg/dL    Total Protein 6.9 6.1 - 7.9 g/dL    Albumin 3.80 3.50 - 4.80 g/dL    ALT (SGPT) 14 (L) 17 - 63 U/L    AST (SGOT) 20 15 - 41 U/L    Alkaline Phosphatase 54 32 - 91 U/L    Total Bilirubin 0.5 0.3 - 1.2 mg/dL    eGFR Non African Amer 103 >60 mL/min/1.73    Globulin 3.1 2.5 - 3.8 gm/dL    A/G Ratio 1.2 1.0 - 1.7 g/dL    BUN/Creatinine Ratio 15.0 6.2 - 20.3    Anion Gap 13.9 5.0 - 15.0 mmol/L   Lipase   Result Value Ref Range    Lipase 39 22 - 51 U/L   CBC Auto Differential   Result Value Ref Range    WBC 10.30 3.40 - 10.80 10*3/mm3    RBC 4.05 (L) 4.14 - 5.80 10*6/mm3    Hemoglobin 13.8 13.0 - 17.7 g/dL    Hematocrit 39.7 37.5 - 51.0 %  "   MCV 98.0 (H) 79.0 - 97.0 fL    MCH 34.2 (H) 26.6 - 33.0 pg    MCHC 34.9 31.5 - 35.7 g/dL    RDW 13.8 12.3 - 15.4 %    RDW-SD 47.3 37.0 - 54.0 fl    MPV 8.3 6.0 - 12.0 fL    Platelets 252 140 - 450 10*3/mm3    Neutrophil % 58.5 42.7 - 76.0 %    Lymphocyte % 31.6 19.6 - 45.3 %    Monocyte % 6.0 5.0 - 12.0 %    Eosinophil % 2.4 0.3 - 6.2 %    Basophil % 1.5 0.0 - 1.5 %    Neutrophils, Absolute 6.00 1.70 - 7.00 10*3/mm3    Lymphocytes, Absolute 3.20 (H) 0.70 - 3.10 10*3/mm3    Monocytes, Absolute 0.60 0.10 - 0.90 10*3/mm3    Eosinophils, Absolute 0.20 0.00 - 0.40 10*3/mm3    Basophils, Absolute 0.20 0.00 - 0.20 10*3/mm3    nRBC 0.1 0.0 - 0.2 /100 WBC   D-dimer, Quantitative   Result Value Ref Range    D-Dimer, Quantitative 1.74 (H) 0.17 - 0.59 MCGFEU/mL     Medications   sodium chloride 0.9 % flush 10 mL (not administered)   sodium chloride 0.9 % bolus 500 mL (0 mL Intravenous Stopped 10/8/19 2137)   HYDROmorphone (DILAUDID) injection 1 mg (1 mg Intravenous Given 10/8/19 2059)   ondansetron (ZOFRAN) injection 4 mg (4 mg Intravenous Given 10/8/19 2059)   iopamidol (ISOVUE-370) 76 % injection 100 mL (100 mL Intravenous Given 10/8/19 2138)   HYDROmorphone (DILAUDID) injection 1 mg (1 mg Intravenous Given 10/8/19 2208)   aluminum-magnesium hydroxide-simethicone (MAALOX MAX) 400-400-40 MG/5ML suspension 15 mL (15 mL Oral Given 10/9/19 0001)   Lidocaine Viscous HCl (XYLOCAINE) 2 % mouth solution 15 mL (15 mL Mouth/Throat Given 10/9/19 0002)     /79   Pulse 70   Temp 98.2 °F (36.8 °C) (Oral)   Resp 18   Ht 188 cm (74\")   Wt 100 kg (220 lb 7.4 oz)   SpO2 97%   BMI 28.31 kg/m²             MDM  Number of Diagnoses or Management Options  Hematochezia:   Nausea:   Pain of upper abdomen:      Amount and/or Complexity of Data Reviewed  Clinical lab tests: reviewed  Tests in the radiology section of CPT®: reviewed  Review and summarize past medical records: (Prior labs reviewed, the patient had labs performed August " 2019 alk phos 48 AST 22 ALT 41 total bili 0.8    Last CT abdomen pelvis ultrasound pelvis performed in 2017)    Risk of Complications, Morbidity, and/or Mortality  General comments: Comorbidities:  Past medical history, allergies, current medications family history social history noted above    Review and summarization of lab specimens, radiology:  ED tests reviewed by me    Differentials: Acute surgical abdomen, hepatitis, pancreatitis, colitis, enteritis, gastric ulceration, hematochezia, melena,; this list is not all inclusive and does not constitute the entirety of considered causes        PERC Rule for Pulmonary Embolism from [email or web address]  on 10/9/2019  ** All calculations should be rechecked by clinician prior to use **    RESULT SUMMARY:  0 criteria  No need for further workup, as <2% chance of PE.    If no criteria are positive and clinician's pre-test probability is <15%, PERC Rule criteria are satisfied.      INPUTS:  Age =50 -> 0 = No  HR =100 -> 0 = No  O2 sat on room air  -> 0 = No  Unilateral leg swelling -> 0 = No  Hemoptysis -> 0 = No  Recent surgery or trauma -> 0 = No  Prior PE or DVT -> 0 = No  Hormone use -> 0 = No              Examination the patient reports continued pain, findings are reviewed with him he is aware that there is improvement from prior testing, the patient now states that his pain is a tight band across his chest and states that he is short of breath, he reports that the pain radiates up to his chest, denies radiation through to the back; this was discussed with patient, additional testing will be obtained because of the patient's new complaints, GI cocktail given.    Dimer was noted to be elevated, the patient is unable to receive PE protocol because he is already received abdominal CT with IV contrast; he is aware of findings, reviewed the risk and benefits of initiating anticoagulation with the patient's active GI bleeding, the patient suddenly states that the GI cocktail  helped and states that that the pain that he is having and described is actually chronic pain, the patient is said it is been going on for several weeks but states he was unsure of whether or not he reported it.   At this time due to the patient's GI bleeding in change in his history, anticoagulation will not be initiated as there is increased risk of the patient having further GI bleeding, he agrees with this recommendation.    He remained stable in the ED and is admitted to hospitalist service medical monitored bed 23-hour observation.      Final diagnoses:   Pain of upper abdomen   Nausea   Hematochezia   Chronic chest pain   Chronic dyspnea              Alka Almeida, LUCAS  10/09/19 0058

## 2019-10-09 NOTE — PLAN OF CARE
Problem: Patient Care Overview  Goal: Plan of Care Review  Outcome: Ongoing (interventions implemented as appropriate)   10/09/19 0254   Coping/Psychosocial   Plan of Care Reviewed With patient   Plan of Care Review   Progress no change   OTHER   Outcome Summary patient continues to complain of generalized abdominal pain. awaiting orders and plan of care.      Goal: Individualization and Mutuality  Outcome: Ongoing (interventions implemented as appropriate)    Goal: Discharge Needs Assessment  Outcome: Ongoing (interventions implemented as appropriate)    Goal: Interprofessional Rounds/Family Conf  Outcome: Ongoing (interventions implemented as appropriate)      Problem: Pain, Chronic (Adult)  Goal: Identify Related Risk Factors and Signs and Symptoms  Outcome: Ongoing (interventions implemented as appropriate)    Goal: Acceptable Pain/Comfort Level and Functional Ability  Outcome: Ongoing (interventions implemented as appropriate)

## 2019-10-09 NOTE — H&P
[facility]            Primary Care Provider:  Provider, No Known    Patient Care Team:  Provider, No Known as PCP - General  Provider, No Known as PCP - Family Medicine    CHIEF COMPLAINT:     Chief Complaint   Patient presents with   • Abdominal Pain       Abdominal pain     HISTORY OF PRESENT ILLNESS:    49-year-old male presents to the ER with a chief complaint of severe epigastric pain with radiation to the left that started around 5 AM on Tuesday morning progressively worsened throughout the day.  The pain was associated with nausea and vomiting.  There is been no diarrhea.  There is been no subjective fever or chills.  The patient has a history of chronic pancreatitis 2 years and went to see his gastroenterologist yesterday to see if he could get samples of Creon.  The patient has been off his Creon for excellently year because his insurance will not pay for it.  He has not periodically gotten samples from the gastroenterology office.          Past Medical History:   Diagnosis Date   • History of anal fissures    • hypothyroidism    • Pancreatitis        Past Surgical History:   Procedure Laterality Date   • KNEE ACL RECONSTRUCTION     • NECK SURGERY     • NECK SURGERY  2012    C6-C7       History reviewed. No pertinent family history.    Social History     Tobacco Use   • Smoking status: Current Every Day Smoker     Packs/day: 0.50   • Smokeless tobacco: Never Used   Substance Use Topics   • Alcohol use: No     Frequency: Never     Comment: Previous Daily/Binge drinker   • Drug use: No       Medications Prior to Admission   Medication Sig Dispense Refill Last Dose   • diclofenac (VOLTAREN) 1 % gel gel Apply 4 g topically to the appropriate area as directed 4 (Four) Times a Day As Needed.      • levothyroxine (SYNTHROID, LEVOTHROID) 100 MCG tablet Take 100 mcg by mouth Daily.      • lidocaine (LIDODERM) 5 % Place 1 patch on the skin as directed by provider Daily.  "Remove & Discard patch within 12 hours or as directed by MD      • methocarbamol (ROBAXIN) 750 MG tablet Take 750 mg by mouth 2 (Two) Times a Day.      • ondansetron (ZOFRAN) 4 MG tablet Take 4 mg by mouth Every 8 (Eight) Hours As Needed for Nausea or Vomiting.      • pantoprazole (PROTONIX) 40 MG EC tablet Take 40 mg by mouth Daily.      • promethazine (PHENERGAN) 25 MG tablet Take 25 mg by mouth Every 6 (Six) Hours As Needed for Nausea or Vomiting.          Allergies:  Penicillins; Ibuprofen; and Cyclobenzaprine      There is no immunization history on file for this patient.        REVIEW OF SYSTEMS:     Review of Systems   Constitution: Positive for decreased appetite and weight loss. Negative for chills and fever.   HENT: Negative.    Gastrointestinal: Positive for abdominal pain, anorexia, nausea and vomiting. Negative for diarrhea and melena.       Vital Signs  Temp:  [97.7 °F (36.5 °C)-98.2 °F (36.8 °C)] 97.7 °F (36.5 °C)  Heart Rate:  [70-99] 76  Resp:  [15-18] 18  BP: (104-146)/(54-92) 126/77    Flowsheet Rows      First Filed Value   Admission Height  188 cm (74\") Documented at 10/08/2019 2014   Admission Weight  100 kg (220 lb 7.4 oz) Documented at 10/08/2019 2014           Physical Exam:    Physical Exam   Constitutional: He is oriented to person, place, and time. He appears well-developed and well-nourished. No distress.   HENT:   Head: Normocephalic and atraumatic.   Eyes: EOM are normal. Pupils are equal, round, and reactive to light. No scleral icterus.   Neck: Normal range of motion. Neck supple. No JVD present. No tracheal deviation present.   Cardiovascular: Normal rate, regular rhythm, normal heart sounds and intact distal pulses.   No murmur heard.  Pulmonary/Chest: Effort normal and breath sounds normal. No respiratory distress.   Abdominal: Soft. Bowel sounds are normal. He exhibits no distension. There is tenderness.   Musculoskeletal: Normal range of motion. He exhibits no edema. "   Neurological: He is alert and oriented to person, place, and time.   Skin: Skin is warm and dry. Capillary refill takes less than 2 seconds. He is not diaphoretic.   Psychiatric: He has a normal mood and affect. His behavior is normal. Judgment and thought content normal.   Vitals reviewed.      Emotional Behavior:   Cooperative   Debilities: None   age appropriate      Results Review:      I reviewed the patient's new clinical results.    Lab Results (most recent)     Procedure Component Value Units Date/Time    Lipid Panel [[id number]] Collected:  10/09/19 0608    Specimen:  Blood Updated:  10/09/19 0631    Troponin [[id number]]  (Normal) Collected:  10/09/19 0005    Specimen:  Blood Updated:  10/09/19 0051     Troponin I <0.030 ng/mL     Narrative:       Troponin I Reference Range:    0.00-0.03  Negative.  Repeat testing in 4-6 hours if clinically indicated.    0.04-0.29  Suspicious for myocardial injury. Serial measurements and clinical  correlation may be necessary to confirm or exclude diagnosis of acute  coronary syndrome.  Repeat testing in 4-6 hours if indicated.     >0.29 Consistent with myocardial injury.  Recommend clinical and laboratory correlation.     Results my be falsely decreased if patient taking Biotin.     D-dimer, Quantitative [[id number]]  (Abnormal) Collected:  10/09/19 0005    Specimen:  Blood Updated:  10/09/19 0019     D-Dimer, Quantitative 1.74 MCGFEU/mL     Narrative:       Reference Range  --------------------------------------------------------------------     < 0.50   Negative Predictive Value  0.50-0.59   Indeterminate    >= 0.60   Probable VTE             A very low percentage of patients with DVT may yield D-Dimer results   below the cut-off of 0.50 MCGFEU/mL.  This is known to be more   prevalent in patients with distal DVT.             Results of this test should always be interpreted in conjunction with   the patient's medical history, clinical presentation and other   findings.   Clinical diagnosis should not be based on the result of   INNOVANCE D-Dimer alone.    Comprehensive Metabolic Panel [[id number]]  (Abnormal) Collected:  10/08/19 2049    Specimen:  Blood Updated:  10/08/19 2121     Glucose 114 mg/dL      BUN 12 mg/dL      Creatinine 0.80 mg/dL      Sodium 138 mmol/L      Potassium 3.9 mmol/L      Chloride 104 mmol/L      CO2 24.0 mmol/L      Calcium 8.8 mg/dL      Total Protein 6.9 g/dL      Albumin 3.80 g/dL      ALT (SGPT) 14 U/L      AST (SGOT) 20 U/L      Alkaline Phosphatase 54 U/L      Total Bilirubin 0.5 mg/dL      eGFR Non African Amer 103 mL/min/1.73      Globulin 3.1 gm/dL      A/G Ratio 1.2 g/dL      BUN/Creatinine Ratio 15.0     Anion Gap 13.9 mmol/L     Lipase [[id number]]  (Normal) Collected:  10/08/19 2049    Specimen:  Blood Updated:  10/08/19 2121     Lipase 39 U/L     CBC & Differential [[id number]] Collected:  10/08/19 2049    Specimen:  Blood Updated:  10/08/19 2058    Narrative:       The following orders were created for panel order CBC & Differential.  Procedure                               Abnormality         Status                     ---------                               -----------         ------                     CBC Auto Differential[[id number]]        Abnormal            Final result                 Please view results for these tests on the individual orders.    CBC Auto Differential [[id number]]  (Abnormal) Collected:  10/08/19 2049    Specimen:  Blood Updated:  10/08/19 2058     WBC 10.30 10*3/mm3      RBC 4.05 10*6/mm3      Hemoglobin 13.8 g/dL      Hematocrit 39.7 %      MCV 98.0 fL      MCH 34.2 pg      MCHC 34.9 g/dL      RDW 13.8 %      RDW-SD 47.3 fl      MPV 8.3 fL      Platelets 252 10*3/mm3      Neutrophil % 58.5 %      Lymphocyte % 31.6 %      Monocyte % 6.0 %      Eosinophil % 2.4 %      Basophil % 1.5 %      Neutrophils, Absolute 6.00 10*3/mm3      Lymphocytes, Absolute 3.20 10*3/mm3      Monocytes, Absolute 0.60 10*3/mm3       Eosinophils, Absolute 0.20 10*3/mm3      Basophils, Absolute 0.20 10*3/mm3      nRBC 0.1 /100 WBC           Imaging Results (most recent)     Procedure Component Value Units Date/Time    CT Abdomen Pelvis With Contrast [[id number]] Collected:  10/08/19 2204     Updated:  10/08/19 2222    Narrative:       CT ABDOMEN PELVIS W CONTRAST-     Date of Exam: 10/8/2019 9:35 PM     Indication: upper abd pain; R10.10-Upper abdominal pain, unspecified;  R11.0-Nausea; K92.1-Melena.     Comparison: 08/13/2017     Technique: Contiguous axial CT images were obtained from the lung bases  to the superior iliac crests without contrast.  Following uneventful IV  administration of 100 Isovue-370 and oral contrast, contiguous axial  images obtained from lung bases to the pubic symphysis. Sagittal and  coronal reconstructions were performed.  Automated exposure control and  iterative reconstruction methods were used.     FINDINGS:  There is no significant lung base abnormality.     There is a 1 cm right renal lesion which is larger than on the prior  exam and is probably a cyst. No abnormality is seen in the adrenals,  left kidney, spleen, ureters, or bladder. There is a subcentimeter  lesion in the left lobe of the liver consistent with a cyst.     There is mild stranding in the fat around the pancreatic head. This is  less than that shown on 08/13/2017. Pancreatic tissue enhances normally.  The gallbladder is contracted. There is borderline intrahepatic biliary  dilatation. The common duct does not appear distended. No opaque ductal  stone is seen. Vessels around the pancreas appear normally opacified.     The stomach is distended with fluid. There is a lobulated hypodense  structure that in the posterior inferior aspect of the body the stomach  that measures about 2 x 1.8 cm could possibly be within the wall. This  has Hounsfield reading of 0-5 and it does not appear significant changed  from the prior exam.     The mesenteric small  bowel is unremarkable. The appendix appears normal.  There is sigmoid diverticulosis. There is no free fluid or abscess. No  free air is noted. There is no significant adenopathy. There is  degenerative disc disease at L4-5.       Impression:       1. There is evidence of mild acute uncomplicated interstitial edematous  pancreatitis involving the head of the pancreas. Pancreatitis was also  reported on CT of 08/13/2017; on today's study it looks lesser in  severity and extent.  2. There is a 2 cm lobular fluid density structure in the inferior  aspect of the body the stomach which could be in the wall. 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 It is not significantly changed from  2017.      Electronically Signed By-Melissa Eastman On:10/8/2019 10:20 PM  This report was finalized on [id number] by  Melissa Eastman, .        reviewed    ECG/EMG Results (most recent)     Procedure Component Value Units Date/Time    ECG 12 Lead [[id number]] Collected:  10/08/19 2103     Updated:  10/09/19 0641    Narrative:       HEART RATE= 84  bpm  RR Interval= 716  ms  NJ Interval= 157  ms  P Horizontal Axis= 18  deg  P Front Axis= 62  deg  QRSD Interval= 100  ms  QT Interval= 377  ms  QRS Axis= -20  deg  T Wave Axis= 70  deg  - NORMAL ECG -  Sinus rhythm  When compared with ECG of 30-May-2017 16:36:39,  Significant rate increase  Electronically Signed By:   Date and Time of Study: 2019-10-08 21:03:24        reviewed              CT Abdomen Pelvis With Contrast  Narrative: CT ABDOMEN PELVIS W CONTRAST-     Date of Exam: 10/8/2019 9:35 PM     Indication: upper abd pain; R10.10-Upper abdominal pain, unspecified;  R11.0-Nausea; K92.1-Melena.     Comparison: 08/13/2017     Technique: Contiguous axial CT images were obtained from the lung bases  to the superior iliac crests without contrast.  Following uneventful IV  administration of 100 Isovue-370 and oral contrast, contiguous axial  images obtained from lung bases to the pubic  symphysis. Sagittal and  coronal reconstructions were performed.  Automated exposure control and  iterative reconstruction methods were used.     FINDINGS:  There is no significant lung base abnormality.     There is a 1 cm right renal lesion which is larger than on the prior  exam and is probably a cyst. No abnormality is seen in the adrenals,  left kidney, spleen, ureters, or bladder. There is a subcentimeter  lesion in the left lobe of the liver consistent with a cyst.     There is mild stranding in the fat around the pancreatic head. This is  less than that shown on 08/13/2017. Pancreatic tissue enhances normally.  The gallbladder is contracted. There is borderline intrahepatic biliary  dilatation. The common duct does not appear distended. No opaque ductal  stone is seen. Vessels around the pancreas appear normally opacified.     The stomach is distended with fluid. There is a lobulated hypodense  structure that in the posterior inferior aspect of the body the stomach  that measures about 2 x 1.8 cm could possibly be within the wall. This  has Hounsfield reading of 0-5 and it does not appear significant changed  from the prior exam.     The mesenteric small bowel is unremarkable. The appendix appears normal.  There is sigmoid diverticulosis. There is no free fluid or abscess. No  free air is noted. There is no significant adenopathy. There is  degenerative disc disease at L4-5.     Impression: 1. There is evidence of mild acute uncomplicated interstitial edematous  pancreatitis involving the head of the pancreas. Pancreatitis was also  reported on CT of 08/13/2017; on today's study it looks lesser in  severity and extent.  2. There is a 2 cm lobular fluid density structure in the inferior  aspect of the body the stomach which could be in the wall. 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 It is not significantly changed from  2017.      Electronically Signed By-Melissa Eastman On:10/8/2019 10:20 PM  This report  was finalized on [id number] by  Melissa Eastman .  XR Foot 2 View Bilateral  DATE OF SERVICE:  November 12, 2017    PROCEDURE:  XR FEET BILAT    HISTORY:  foreign body    COMPARISON:  None.    TECHNIQUE:  A minimum of three routine standard radiographic views were obtained of the  bilateral feet.    DISCUSSION:  There is no fracture or dislocation.  The joint spaces are well maintained and  unremarkable.  The bony alignment is also normal.  There are no osseous  lesions.   Soft tissues are unremarkable.    No obvious radiopaque foreign bodies are noted involving either foot    IMPRESSION:  Negative    Caleb Shay MD    DS: 11/12/2017 18:10  Report ID: [id number]  cc: INGRID LEWIS  FINAL AUTHENTICATED COPY      Assessment/Plan       Pain of upper abdomen      Assessment/Plan:     Acute abdominal pain--likely secondary to chronic pancreatitis; consider acute on chronic pancreatitis: Analgesics and antiemetics; repeat lipase    --CT shows mildly edematous pancreas; initial lipase negative    Chronic pain--continue Voltaren patch, lidocaine patch, Robaxin    GERD--continue Protonix    DVT prophylaxis--Lovenox    Disposition    Acute abdominal pain will likely be able to evaluate treat and stabilize in 23-hour observation timeframe.    I discussed the patients findings and my recommendations with patient.     CORAZON Bullard  10/09/19  6:45 AM

## 2019-10-09 NOTE — DISCHARGE SUMMARY
Date of Admission: 10/8/2019    Date of Discharge:  10/9/2019    Length of stay:  LOS: 0 days   Chief complaint abdominal pain  Hospital Course  49-year-old male presents to the ER with a chief complaint of severe epigastric pain with radiation to the left that started around 5 AM on Tuesday morning progressively worsened throughout the day.  The pain was associated with nausea and vomiting.  There is been no diarrhea.  There is been no subjective fever or chills.  The patient has a history of chronic pancreatitis 2 years and went to see his gastroenterologist yesterday to see if he could get samples of Creon.  The patient has been off his Creon for excellently year because his insurance will not pay for it.  He has not periodically gotten samples from the gastroenterology office.    Hospital course and problem list    Patient admitted with above presentation.  This morning he felt better.  There is no nausea no vomiting.  Abdominal pain is more described as distention.  He admits that it is likely due to noncompliance with Creon.  I gave him a prescription for the medication which we will try to refill at pharmacy if he is not successful he will follow-up with his gastroenterologist for samples.  He thinks he will be able to get the samples this time.  If not he will fill his prescription.  Otherwise his CT abdomen pelvis was consistent with chronic pancreatitis.  His lipase was normal.  His symptoms as I mentioned much improved.  He has no chest pain no nausea no vomiting.  Otherwise blood work not impressive.  Elevated d-dimer nonspecific he has no shortness of breath no chest pain no lower extremity edema no lower extremity pain.  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  Also  he will follow-up with his primary care physician.  Patient is agreeable with this plan feels better and is ready for discharge    Physical Exam   Constitutional: He is oriented to person, place, and time. No distress.   HENT:   Head: Normocephalic and atraumatic.   Eyes: Conjunctivae and EOM are normal. Pupils are equal, round, and reactive to light.   Neck: No JVD present. No thyromegaly present.   Cardiovascular: Normal rate, regular rhythm, normal heart sounds and intact distal pulses. Exam reveals no gallop and no friction rub.   No murmur heard.  Pulmonary/Chest: Effort normal and breath sounds normal. No stridor. No respiratory distress. He has no wheezes. He has no rales. He exhibits no tenderness.   Abdominal: Soft. Bowel sounds are normal. He exhibits no distension and no mass. There is no tenderness. There is no rebound and no guarding. No hernia.   Musculoskeletal: Normal range of motion.   Lymphadenopathy:     He has no cervical adenopathy.   Neurological: He is alert and oriented to person, place, and time. No cranial nerve deficit or sensory deficit. He exhibits normal muscle tone.   Skin: No rash noted. He is not diaphoretic.   Psychiatric: He has a normal mood and affect.   Vitals reviewed.        Pertinent Test Results:     Lab Results (last 48 hours)     Procedure Component Value Units Date/Time    Lipid Panel [[id number]]  (Abnormal) Collected:  10/09/19 0608    Specimen:  Blood Updated:  10/09/19 0711     Total Cholesterol 131 mg/dL      Triglycerides 149 mg/dL      HDL Cholesterol 29 mg/dL      LDL Cholesterol  94 mg/dL      VLDL Cholesterol 29.8 mg/dL      LDL/HDL Ratio 2.49     Chol/HDL Ratio 4.52    Narrative:       The following guidelines have been recommended by the NCEP for Total Cholesterol, Total Triglycerides, LDL Cholesterol, and HDL Cholesterols    Total Cholesterol  Desirable:        <200 mg/dL  Borderline High:  200-239 mg/dL  High:             > or = 240 mg/dL    Total  Triglyceride  Normal:           <150 mg/dL  Borderline High:  150-199 mg/dL  High:             200-499 mg/dL  Very High:        > or = 500 mg/dL    HDL Cholesterol  Low HDL:          <40 mg/dL  Normal:           40-60 mg/dL  Desirable:        >60 mg/dL    LDL Cholesterol  Optimal:          <100 mg/dL  Low Risk:         100-129 mg/dL  Borderline High:  130-159 mg/dL  High:             160-189 mg/dL  Very High:        > or = 190 mg/dL    The following ratios of LDL to HDL and Total cholesterol to HDL are for information only:    LDL/HDL Ratio  Desirable:        <5  Optimal:          < or = 3.5    Total Cholesterol/HDL Ratio  Low Risk:         3.3-4.4  Average Risk:     4.4-7.1  Medium Risk:      7.1-11  High Risk:        >11       Troponin [[id number]]  (Normal) Collected:  10/09/19 0005    Specimen:  Blood Updated:  10/09/19 0051     Troponin I <0.030 ng/mL     Narrative:       Troponin I Reference Range:    0.00-0.03  Negative.  Repeat testing in 4-6 hours if clinically indicated.    0.04-0.29  Suspicious for myocardial injury. Serial measurements and clinical  correlation may be necessary to confirm or exclude diagnosis of acute  coronary syndrome.  Repeat testing in 4-6 hours if indicated.     >0.29 Consistent with myocardial injury.  Recommend clinical and laboratory correlation.     Results my be falsely decreased if patient taking Biotin.     D-dimer, Quantitative [[id number]]  (Abnormal) Collected:  10/09/19 0005    Specimen:  Blood Updated:  10/09/19 0019     D-Dimer, Quantitative 1.74 MCGFEU/mL     Narrative:       Reference Range  --------------------------------------------------------------------     < 0.50   Negative Predictive Value  0.50-0.59   Indeterminate    >= 0.60   Probable VTE             A very low percentage of patients with DVT may yield D-Dimer results   below the cut-off of 0.50 MCGFEU/mL.  This is known to be more   prevalent in patients with distal DVT.             Results of this test  should always be interpreted in conjunction with   the patient's medical history, clinical presentation and other   findings.  Clinical diagnosis should not be based on the result of   INNOVANCE D-Dimer alone.    Comprehensive Metabolic Panel [[id number]]  (Abnormal) Collected:  10/08/19 2049    Specimen:  Blood Updated:  10/08/19 2121     Glucose 114 mg/dL      BUN 12 mg/dL      Creatinine 0.80 mg/dL      Sodium 138 mmol/L      Potassium 3.9 mmol/L      Chloride 104 mmol/L      CO2 24.0 mmol/L      Calcium 8.8 mg/dL      Total Protein 6.9 g/dL      Albumin 3.80 g/dL      ALT (SGPT) 14 U/L      AST (SGOT) 20 U/L      Alkaline Phosphatase 54 U/L      Total Bilirubin 0.5 mg/dL      eGFR Non African Amer 103 mL/min/1.73      Globulin 3.1 gm/dL      A/G Ratio 1.2 g/dL      BUN/Creatinine Ratio 15.0     Anion Gap 13.9 mmol/L     Lipase [[id number]]  (Normal) Collected:  10/08/19 2049    Specimen:  Blood Updated:  10/08/19 2121     Lipase 39 U/L     CBC & Differential [[id number]] Collected:  10/08/19 2049    Specimen:  Blood Updated:  10/08/19 2058    Narrative:       The following orders were created for panel order CBC & Differential.  Procedure                               Abnormality         Status                     ---------                               -----------         ------                     CBC Auto Differential[[id number]]        Abnormal            Final result                 Please view results for these tests on the individual orders.    CBC Auto Differential [[id number]]  (Abnormal) Collected:  10/08/19 2049    Specimen:  Blood Updated:  10/08/19 2058     WBC 10.30 10*3/mm3      RBC 4.05 10*6/mm3      Hemoglobin 13.8 g/dL      Hematocrit 39.7 %      MCV 98.0 fL      MCH 34.2 pg      MCHC 34.9 g/dL      RDW 13.8 %      RDW-SD 47.3 fl      MPV 8.3 fL      Platelets 252 10*3/mm3      Neutrophil % 58.5 %      Lymphocyte % 31.6 %      Monocyte % 6.0 %      Eosinophil % 2.4 %      Basophil % 1.5 %       Neutrophils, Absolute 6.00 10*3/mm3      Lymphocytes, Absolute 3.20 10*3/mm3      Monocytes, Absolute 0.60 10*3/mm3      Eosinophils, Absolute 0.20 10*3/mm3      Basophils, Absolute 0.20 10*3/mm3      nRBC 0.1 /100 WBC                  Imaging Results (all)     Procedure Component Value Units Date/Time    CT Abdomen Pelvis With Contrast [[id number]] Collected:  10/08/19 2204     Updated:  10/08/19 2222    Narrative:       CT ABDOMEN PELVIS W CONTRAST-     Date of Exam: 10/8/2019 9:35 PM     Indication: upper abd pain; R10.10-Upper abdominal pain, unspecified;  R11.0-Nausea; K92.1-Melena.     Comparison: 08/13/2017     Technique: Contiguous axial CT images were obtained from the lung bases  to the superior iliac crests without contrast.  Following uneventful IV  administration of 100 Isovue-370 and oral contrast, contiguous axial  images obtained from lung bases to the pubic symphysis. Sagittal and  coronal reconstructions were performed.  Automated exposure control and  iterative reconstruction methods were used.     FINDINGS:  There is no significant lung base abnormality.     There is a 1 cm right renal lesion which is larger than on the prior  exam and is probably a cyst. No abnormality is seen in the adrenals,  left kidney, spleen, ureters, or bladder. There is a subcentimeter  lesion in the left lobe of the liver consistent with a cyst.     There is mild stranding in the fat around the pancreatic head. This is  less than that shown on 08/13/2017. Pancreatic tissue enhances normally.  The gallbladder is contracted. There is borderline intrahepatic biliary  dilatation. The common duct does not appear distended. No opaque ductal  stone is seen. Vessels around the pancreas appear normally opacified.     The stomach is distended with fluid. There is a lobulated hypodense  structure that in the posterior inferior aspect of the body the stomach  that measures about 2 x 1.8 cm could possibly be within the wall.  "This  has Hounsfield reading of 0-5 and it does not appear significant changed  from the prior exam.     The mesenteric small bowel is unremarkable. The appendix appears normal.  There is sigmoid diverticulosis. There is no free fluid or abscess. No  free air is noted. There is no significant adenopathy. There is  degenerative disc disease at L4-5.       Impression:       1. There is evidence of mild acute uncomplicated interstitial edematous  pancreatitis involving the head of the pancreas. Pancreatitis was also  reported on CT of 08/13/2017; on today's study it looks lesser in  severity and extent.  2. There is a 2 cm lobular fluid density structure in the inferior  aspect of the body the stomach which could be in the wall. 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 It is not significantly changed from  2017.      Electronically Signed By-Melissa Eastman On:10/8/2019 10:20 PM  This report was finalized on [id number] by  Melissa Eastman, .            Vital Signs  Visit Vitals  /70 (BP Location: Right arm, Patient Position: Lying)   Pulse 76   Temp 97.7 °F (36.5 °C) (Oral)   Resp 20   Ht 188 cm (74\")   Wt 99.2 kg (218 lb 11.1 oz)   SpO2 96%   BMI 28.08 kg/m²       Physical Exam:  Physical Exam      Discharge Medications     Discharge Medications      New Medications      Instructions Start Date   Pancrelipase (Lip-Prot-Amyl) 3887-2577 units capsule delayed-release particles capsule  Commonly known as:  CREON   3,000 units of lipase, Oral, 3 Times Daily With Meals         Continue These Medications      Instructions Start Date   diclofenac 1 % gel gel  Commonly known as:  VOLTAREN   4 g, Topical, 4 Times Daily PRN      levothyroxine 100 MCG tablet  Commonly known as:  SYNTHROID, LEVOTHROID   100 mcg, Oral, Daily      lidocaine 5 %  Commonly known as:  LIDODERM   1 patch, Transdermal, Every 24 Hours, Remove & Discard patch within 12 hours or as directed by MD       methocarbamol 750 MG tablet  Commonly known as:  " ROBAXIN   750 mg, Oral, 2 Times Daily      ondansetron 4 MG tablet  Commonly known as:  ZOFRAN   4 mg, Oral, Every 8 Hours PRN      pantoprazole 40 MG EC tablet  Commonly known as:  PROTONIX   40 mg, Oral, Daily      promethazine 25 MG tablet  Commonly known as:  PHENERGAN   25 mg, Oral, Every 6 Hours PRN             Discharge Diet:   Diet Instructions     Diet: Regular      Discharge Diet:  Regular          Activity at Discharge:   Activity Instructions     Activity as Tolerated            Follow-up Appointments  No future appointments.  Additional Instructions for the Follow-ups that You Need to Schedule     Discharge Follow-up with PCP   As directed       Currently Documented PCP:    Provider, No Known    PCP Phone Number:    None     Follow Up Details:  2-3 days         Discharge Follow-up with Specialty: GI in 2-5 days   As directed      Specialty:  GI in 2-5 days                   Lemuel Escalante MD  10/09/19  12:30 PM    Time: Discharge 38 min

## 2019-10-09 NOTE — PROGRESS NOTES
Discharge Planning Assessment   [facility]     Patient Name: Yan Hernandez  MRN: [id number]  Today's Date: 10/9/2019    Admit Date: 10/8/2019    Discharge Needs Assessment     Row Name 10/09/19 1041       Living Environment    Current Living Arrangements  home/apartment/condo    Primary Care Provided by  self    Able to Return to Prior Arrangements  yes       Resource/Environmental Concerns    Resource/Environmental Concerns  none    Transportation Concerns  car, none       Transition Planning    Patient/Family Anticipates Transition to  home    Patient/Family Anticipated Services at Transition  none    Transportation Anticipated  agency       Discharge Needs Assessment    Concerns to be Addressed  no discharge needs identified;denies needs/concerns at this time    Equipment Currently Used at Home  none    Anticipated Changes Related to Illness  none    Equipment Needed After Discharge  none    Discharge Coordination/Progress  Patient states no DME, no trouble affording medications, usually sees [facility] in [place] but states he is in [place] a lot of the times and sees [place].         Discharge Plan     Row Name 10/09/19 1042       Plan    Plan  Anticipate routine home, denies needs at this time.    Patient/Family in Agreement with Plan  yes    Plan Comments  DC orders in, patient states he has a ride home.        Expected Discharge Date and Time     Expected Discharge Date Expected Discharge Time    Oct 9, 2019       Yue Kay RN       Office Phone: [phone]  Office Cell: [phone]

## 2019-10-09 NOTE — ED NOTES
Pt reports LUQ pain x3 hrs, reports hx pancreatitis. Pt reports he was dx with a cyst on his pancreas x1 week ago at [facility], states he had a biopsy done at [place]. Pt reports bright red blood in his stool x3 hrs ago.     Geeta Gomez, LPN  10/08/19 2054

## 2019-10-12 ENCOUNTER — INPATIENT HOSPITAL (OUTPATIENT)
Dept: URBAN - METROPOLITAN AREA HOSPITAL 76 | Facility: HOSPITAL | Age: 49
End: 2019-10-12
Payer: COMMERCIAL

## 2019-10-12 DIAGNOSIS — K92.1 MELENA: ICD-10-CM

## 2019-10-12 DIAGNOSIS — R11.2 NAUSEA WITH VOMITING, UNSPECIFIED: ICD-10-CM

## 2019-10-12 DIAGNOSIS — K85.90 ACUTE PANCREATITIS WITHOUT NECROSIS OR INFECTION, UNSPECIFIE: ICD-10-CM

## 2019-10-12 DIAGNOSIS — F10.10 ALCOHOL ABUSE, UNCOMPLICATED: ICD-10-CM

## 2019-10-12 DIAGNOSIS — R10.12 LEFT UPPER QUADRANT PAIN: ICD-10-CM

## 2019-10-12 DIAGNOSIS — R10.13 EPIGASTRIC PAIN: ICD-10-CM

## 2019-10-12 PROCEDURE — 99222 1ST HOSP IP/OBS MODERATE 55: CPT | Performed by: INTERNAL MEDICINE

## 2019-10-13 PROCEDURE — 99231 SBSQ HOSP IP/OBS SF/LOW 25: CPT | Performed by: INTERNAL MEDICINE

## 2019-10-14 ENCOUNTER — INPATIENT HOSPITAL (OUTPATIENT)
Dept: URBAN - METROPOLITAN AREA HOSPITAL 76 | Facility: HOSPITAL | Age: 49
End: 2019-10-14
Payer: COMMERCIAL

## 2019-10-14 DIAGNOSIS — K92.1 MELENA: ICD-10-CM

## 2019-10-14 DIAGNOSIS — R10.13 EPIGASTRIC PAIN: ICD-10-CM

## 2019-10-14 DIAGNOSIS — R11.2 NAUSEA WITH VOMITING, UNSPECIFIED: ICD-10-CM

## 2019-10-14 DIAGNOSIS — K21.0 GASTRO-ESOPHAGEAL REFLUX DISEASE WITH ESOPHAGITIS: ICD-10-CM

## 2019-10-14 DIAGNOSIS — K31.89 OTHER DISEASES OF STOMACH AND DUODENUM: ICD-10-CM

## 2019-10-14 PROCEDURE — 99232 SBSQ HOSP IP/OBS MODERATE 35: CPT | Performed by: NURSE PRACTITIONER

## 2019-10-15 ENCOUNTER — INPATIENT HOSPITAL (OUTPATIENT)
Dept: URBAN - METROPOLITAN AREA HOSPITAL 76 | Facility: HOSPITAL | Age: 49
End: 2019-10-15
Payer: COMMERCIAL

## 2019-10-15 DIAGNOSIS — K92.1 MELENA: ICD-10-CM

## 2019-10-15 DIAGNOSIS — R10.13 EPIGASTRIC PAIN: ICD-10-CM

## 2019-10-15 DIAGNOSIS — K85.90 ACUTE PANCREATITIS WITHOUT NECROSIS OR INFECTION, UNSPECIFIE: ICD-10-CM

## 2019-10-15 DIAGNOSIS — K21.0 GASTRO-ESOPHAGEAL REFLUX DISEASE WITH ESOPHAGITIS: ICD-10-CM

## 2019-10-15 DIAGNOSIS — R11.2 NAUSEA WITH VOMITING, UNSPECIFIED: ICD-10-CM

## 2019-10-15 PROCEDURE — 99231 SBSQ HOSP IP/OBS SF/LOW 25: CPT | Performed by: NURSE PRACTITIONER

## 2020-05-29 ENCOUNTER — LAB REQUISITION (OUTPATIENT)
Dept: LAB | Facility: HOSPITAL | Age: 50
End: 2020-05-29

## 2020-05-29 DIAGNOSIS — Z00.00 ENCOUNTER FOR GENERAL ADULT MEDICAL EXAMINATION WITHOUT ABNORMAL FINDINGS: ICD-10-CM

## 2020-05-29 PROCEDURE — U0003 INFECTIOUS AGENT DETECTION BY NUCLEIC ACID (DNA OR RNA); SEVERE ACUTE RESPIRATORY SYNDROME CORONAVIRUS 2 (SARS-COV-2) (CORONAVIRUS DISEASE [COVID-19]), AMPLIFIED PROBE TECHNIQUE, MAKING USE OF HIGH THROUGHPUT TECHNOLOGIES AS DESCRIBED BY CMS-2020-01-R: HCPCS | Performed by: EMERGENCY MEDICINE

## 2020-05-30 LAB — SARS-COV-2 RNA RESP QL NAA+PROBE: NOT DETECTED

## 2020-06-11 ENCOUNTER — APPOINTMENT (OUTPATIENT)
Dept: CT IMAGING | Facility: HOSPITAL | Age: 50
End: 2020-06-11

## 2020-06-11 ENCOUNTER — HOSPITAL ENCOUNTER (INPATIENT)
Facility: HOSPITAL | Age: 50
LOS: 1 days | Discharge: HOME | End: 2020-06-13
Attending: HOSPITALIST | Admitting: HOSPITALIST

## 2020-06-11 DIAGNOSIS — K86.1 ACUTE ON CHRONIC PANCREATITIS (HCC): Primary | ICD-10-CM

## 2020-06-11 DIAGNOSIS — R11.2 NAUSEA AND VOMITING, INTRACTABILITY OF VOMITING NOT SPECIFIED, UNSPECIFIED VOMITING TYPE: ICD-10-CM

## 2020-06-11 DIAGNOSIS — R10.13 EPIGASTRIC PAIN: ICD-10-CM

## 2020-06-11 DIAGNOSIS — K85.90 ACUTE ON CHRONIC PANCREATITIS (HCC): Primary | ICD-10-CM

## 2020-06-11 LAB
ALBUMIN SERPL-MCNC: 4.4 G/DL (ref 3.5–5.2)
ALBUMIN/GLOB SERPL: 1.2 G/DL
ALP SERPL-CCNC: 77 U/L (ref 39–117)
ALT SERPL W P-5'-P-CCNC: 17 U/L (ref 1–41)
ANION GAP SERPL CALCULATED.3IONS-SCNC: 14 MMOL/L (ref 5–15)
AST SERPL-CCNC: 21 U/L (ref 1–40)
BACTERIA UR QL AUTO: ABNORMAL /HPF
BASOPHILS # BLD AUTO: 0.1 10*3/MM3 (ref 0–0.2)
BASOPHILS NFR BLD AUTO: 1 % (ref 0–1.5)
BILIRUB SERPL-MCNC: 0.2 MG/DL (ref 0.2–1.2)
BILIRUB UR QL STRIP: NEGATIVE
BUN BLD-MCNC: 14 MG/DL (ref 6–20)
BUN BLD-MCNC: ABNORMAL MG/DL
BUN/CREAT SERPL: ABNORMAL
CALCIUM SPEC-SCNC: 8.9 MG/DL (ref 8.6–10.5)
CHLORIDE SERPL-SCNC: 104 MMOL/L (ref 98–107)
CLARITY UR: CLEAR
CO2 SERPL-SCNC: 22 MMOL/L (ref 22–29)
COLOR UR: YELLOW
CREAT BLD-MCNC: 1.09 MG/DL (ref 0.76–1.27)
DEPRECATED RDW RBC AUTO: 47.7 FL (ref 37–54)
EOSINOPHIL # BLD AUTO: 0.2 10*3/MM3 (ref 0–0.4)
EOSINOPHIL NFR BLD AUTO: 1.5 % (ref 0.3–6.2)
ERYTHROCYTE [DISTWIDTH] IN BLOOD BY AUTOMATED COUNT: 14.5 % (ref 12.3–15.4)
GFR SERPL CREATININE-BSD FRML MDRD: 72 ML/MIN/1.73
GLOBULIN UR ELPH-MCNC: 3.7 GM/DL
GLUCOSE BLD-MCNC: 103 MG/DL (ref 65–99)
GLUCOSE UR STRIP-MCNC: NEGATIVE MG/DL
HCT VFR BLD AUTO: 42.3 % (ref 37.5–51)
HGB BLD-MCNC: 14.8 G/DL (ref 13–17.7)
HGB UR QL STRIP.AUTO: ABNORMAL
HOLD SPECIMEN: NORMAL
HOLD SPECIMEN: NORMAL
HYALINE CASTS UR QL AUTO: ABNORMAL /LPF
KETONES UR QL STRIP: NEGATIVE
LEUKOCYTE ESTERASE UR QL STRIP.AUTO: NEGATIVE
LIPASE SERPL-CCNC: 64 U/L (ref 13–60)
LYMPHOCYTES # BLD AUTO: 4.1 10*3/MM3 (ref 0.7–3.1)
LYMPHOCYTES NFR BLD AUTO: 39.5 % (ref 19.6–45.3)
MCH RBC QN AUTO: 33.1 PG (ref 26.6–33)
MCHC RBC AUTO-ENTMCNC: 35 G/DL (ref 31.5–35.7)
MCV RBC AUTO: 94.4 FL (ref 79–97)
MONOCYTES # BLD AUTO: 0.7 10*3/MM3 (ref 0.1–0.9)
MONOCYTES NFR BLD AUTO: 6.9 % (ref 5–12)
NEUTROPHILS # BLD AUTO: 5.3 10*3/MM3 (ref 1.7–7)
NEUTROPHILS NFR BLD AUTO: 51.1 % (ref 42.7–76)
NITRITE UR QL STRIP: NEGATIVE
NRBC BLD AUTO-RTO: 0.1 /100 WBC (ref 0–0.2)
PH UR STRIP.AUTO: 6.5 [PH] (ref 5–8)
PLATELET # BLD AUTO: 266 10*3/MM3 (ref 140–450)
PMV BLD AUTO: 8.4 FL (ref 6–12)
POTASSIUM BLD-SCNC: 3.9 MMOL/L (ref 3.5–5.2)
PROT SERPL-MCNC: 8.1 G/DL (ref 6–8.5)
PROT UR QL STRIP: NEGATIVE
RBC # BLD AUTO: 4.48 10*6/MM3 (ref 4.14–5.8)
RBC # UR: ABNORMAL /HPF
REF LAB TEST METHOD: ABNORMAL
SODIUM BLD-SCNC: 140 MMOL/L (ref 136–145)
SP GR UR STRIP: 1.02 (ref 1–1.03)
SQUAMOUS #/AREA URNS HPF: ABNORMAL /HPF
UROBILINOGEN UR QL STRIP: ABNORMAL
WBC NRBC COR # BLD: 10.4 10*3/MM3 (ref 3.4–10.8)
WBC UR QL AUTO: ABNORMAL /HPF
WHOLE BLOOD HOLD SPECIMEN: NORMAL
WHOLE BLOOD HOLD SPECIMEN: NORMAL

## 2020-06-11 PROCEDURE — 80053 COMPREHEN METABOLIC PANEL: CPT | Performed by: PHYSICIAN ASSISTANT

## 2020-06-11 PROCEDURE — 85025 COMPLETE CBC W/AUTO DIFF WBC: CPT | Performed by: PHYSICIAN ASSISTANT

## 2020-06-11 PROCEDURE — 81001 URINALYSIS AUTO W/SCOPE: CPT | Performed by: PHYSICIAN ASSISTANT

## 2020-06-11 PROCEDURE — 25010000002 ONDANSETRON PER 1 MG: Performed by: PHYSICIAN ASSISTANT

## 2020-06-11 PROCEDURE — 0 IOPAMIDOL PER 1 ML: Performed by: PHYSICIAN ASSISTANT

## 2020-06-11 PROCEDURE — 99284 EMERGENCY DEPT VISIT MOD MDM: CPT

## 2020-06-11 PROCEDURE — 83690 ASSAY OF LIPASE: CPT | Performed by: PHYSICIAN ASSISTANT

## 2020-06-11 PROCEDURE — 74177 CT ABD & PELVIS W/CONTRAST: CPT

## 2020-06-11 PROCEDURE — 25010000002 MORPHINE PER 10 MG: Performed by: EMERGENCY MEDICINE

## 2020-06-11 PROCEDURE — 25010000002 HYDROMORPHONE PER 4 MG: Performed by: EMERGENCY MEDICINE

## 2020-06-11 RX ORDER — GABAPENTIN 800 MG/1
800 TABLET ORAL 3 TIMES DAILY
COMMUNITY

## 2020-06-11 RX ORDER — HYDROMORPHONE HCL 110MG/55ML
0.5 PATIENT CONTROLLED ANALGESIA SYRINGE INTRAVENOUS ONCE
Status: COMPLETED | OUTPATIENT
Start: 2020-06-11 | End: 2020-06-11

## 2020-06-11 RX ORDER — LISINOPRIL 10 MG/1
10 TABLET ORAL DAILY
COMMUNITY

## 2020-06-11 RX ORDER — MORPHINE SULFATE 4 MG/ML
2 INJECTION, SOLUTION INTRAMUSCULAR; INTRAVENOUS ONCE
Status: COMPLETED | OUTPATIENT
Start: 2020-06-11 | End: 2020-06-11

## 2020-06-11 RX ORDER — ONDANSETRON 2 MG/ML
4 INJECTION INTRAMUSCULAR; INTRAVENOUS ONCE
Status: COMPLETED | OUTPATIENT
Start: 2020-06-11 | End: 2020-06-11

## 2020-06-11 RX ORDER — SODIUM CHLORIDE 0.9 % (FLUSH) 0.9 %
10 SYRINGE (ML) INJECTION AS NEEDED
Status: DISCONTINUED | OUTPATIENT
Start: 2020-06-11 | End: 2020-06-13 | Stop reason: HOSPADM

## 2020-06-11 RX ORDER — BUSPIRONE HYDROCHLORIDE 15 MG/1
25 TABLET ORAL 3 TIMES DAILY
COMMUNITY
End: 2020-06-12

## 2020-06-11 RX ADMIN — MORPHINE SULFATE 2 MG: 4 INJECTION INTRAVENOUS at 23:01

## 2020-06-11 RX ADMIN — SODIUM CHLORIDE 1000 ML: 900 INJECTION, SOLUTION INTRAVENOUS at 22:45

## 2020-06-11 RX ADMIN — IOPAMIDOL 100 ML: 755 INJECTION, SOLUTION INTRAVENOUS at 23:34

## 2020-06-11 RX ADMIN — ONDANSETRON 4 MG: 2 INJECTION INTRAMUSCULAR; INTRAVENOUS at 22:45

## 2020-06-11 RX ADMIN — HYDROMORPHONE HYDROCHLORIDE 0.5 MG: 2 INJECTION, SOLUTION INTRAMUSCULAR; INTRAVENOUS; SUBCUTANEOUS at 23:41

## 2020-06-12 PROBLEM — K85.90 ACUTE ON CHRONIC PANCREATITIS (HCC): Status: ACTIVE | Noted: 2020-06-12

## 2020-06-12 PROBLEM — E03.9 HYPOTHYROIDISM (ACQUIRED): Chronic | Status: ACTIVE | Noted: 2020-06-12

## 2020-06-12 PROBLEM — G89.29 CHRONIC PAIN: Chronic | Status: ACTIVE | Noted: 2020-06-12

## 2020-06-12 PROBLEM — K86.1 ACUTE ON CHRONIC PANCREATITIS (HCC): Status: ACTIVE | Noted: 2020-06-12

## 2020-06-12 LAB
ALBUMIN SERPL-MCNC: 4 G/DL (ref 3.5–5.2)
ALBUMIN/GLOB SERPL: 1.2 G/DL
ALP SERPL-CCNC: 73 U/L (ref 39–117)
ALT SERPL W P-5'-P-CCNC: 21 U/L (ref 1–41)
ANION GAP SERPL CALCULATED.3IONS-SCNC: 10 MMOL/L (ref 5–15)
AST SERPL-CCNC: 28 U/L (ref 1–40)
BASOPHILS # BLD AUTO: 0.1 10*3/MM3 (ref 0–0.2)
BASOPHILS NFR BLD AUTO: 0.7 % (ref 0–1.5)
BILIRUB SERPL-MCNC: 0.3 MG/DL (ref 0.2–1.2)
BUN BLD-MCNC: 13 MG/DL (ref 6–20)
BUN BLD-MCNC: ABNORMAL MG/DL
BUN/CREAT SERPL: ABNORMAL
CALCIUM SPEC-SCNC: 8.5 MG/DL (ref 8.6–10.5)
CHLORIDE SERPL-SCNC: 104 MMOL/L (ref 98–107)
CO2 SERPL-SCNC: 26 MMOL/L (ref 22–29)
CREAT BLD-MCNC: 1.05 MG/DL (ref 0.76–1.27)
DEPRECATED RDW RBC AUTO: 48.1 FL (ref 37–54)
EOSINOPHIL # BLD AUTO: 0.2 10*3/MM3 (ref 0–0.4)
EOSINOPHIL NFR BLD AUTO: 1.6 % (ref 0.3–6.2)
ERYTHROCYTE [DISTWIDTH] IN BLOOD BY AUTOMATED COUNT: 14.5 % (ref 12.3–15.4)
GFR SERPL CREATININE-BSD FRML MDRD: 75 ML/MIN/1.73
GLOBULIN UR ELPH-MCNC: 3.4 GM/DL
GLUCOSE BLD-MCNC: 85 MG/DL (ref 65–99)
GLUCOSE BLDC GLUCOMTR-MCNC: 76 MG/DL (ref 70–105)
HCT VFR BLD AUTO: 43.5 % (ref 37.5–51)
HGB BLD-MCNC: 14.6 G/DL (ref 13–17.7)
LIPASE SERPL-CCNC: 271 U/L (ref 13–60)
LYMPHOCYTES # BLD AUTO: 4.5 10*3/MM3 (ref 0.7–3.1)
LYMPHOCYTES NFR BLD AUTO: 39.5 % (ref 19.6–45.3)
MCH RBC QN AUTO: 31.9 PG (ref 26.6–33)
MCHC RBC AUTO-ENTMCNC: 33.6 G/DL (ref 31.5–35.7)
MCV RBC AUTO: 95.1 FL (ref 79–97)
MONOCYTES # BLD AUTO: 0.9 10*3/MM3 (ref 0.1–0.9)
MONOCYTES NFR BLD AUTO: 8 % (ref 5–12)
NEUTROPHILS # BLD AUTO: 5.7 10*3/MM3 (ref 1.7–7)
NEUTROPHILS NFR BLD AUTO: 50.2 % (ref 42.7–76)
NRBC BLD AUTO-RTO: 0.1 /100 WBC (ref 0–0.2)
PLATELET # BLD AUTO: 249 10*3/MM3 (ref 140–450)
PMV BLD AUTO: 8.2 FL (ref 6–12)
POTASSIUM BLD-SCNC: 4.2 MMOL/L (ref 3.5–5.2)
PROT SERPL-MCNC: 7.4 G/DL (ref 6–8.5)
RBC # BLD AUTO: 4.58 10*6/MM3 (ref 4.14–5.8)
SODIUM BLD-SCNC: 140 MMOL/L (ref 136–145)
WBC NRBC COR # BLD: 11.4 10*3/MM3 (ref 3.4–10.8)

## 2020-06-12 PROCEDURE — 82962 GLUCOSE BLOOD TEST: CPT

## 2020-06-12 PROCEDURE — 80053 COMPREHEN METABOLIC PANEL: CPT | Performed by: NURSE PRACTITIONER

## 2020-06-12 PROCEDURE — 83690 ASSAY OF LIPASE: CPT | Performed by: NURSE PRACTITIONER

## 2020-06-12 PROCEDURE — 25010000002 HYDROMORPHONE PER 4 MG: Performed by: NURSE PRACTITIONER

## 2020-06-12 PROCEDURE — 99222 1ST HOSP IP/OBS MODERATE 55: CPT | Performed by: NURSE PRACTITIONER

## 2020-06-12 PROCEDURE — 85025 COMPLETE CBC W/AUTO DIFF WBC: CPT | Performed by: NURSE PRACTITIONER

## 2020-06-12 RX ORDER — TAMSULOSIN HYDROCHLORIDE 0.4 MG/1
0.4 CAPSULE ORAL NIGHTLY
Status: DISCONTINUED | OUTPATIENT
Start: 2020-06-12 | End: 2020-06-13 | Stop reason: HOSPADM

## 2020-06-12 RX ORDER — BUSPIRONE HYDROCHLORIDE 15 MG/1
7.5 TABLET ORAL EVERY MORNING
Status: DISCONTINUED | OUTPATIENT
Start: 2020-06-12 | End: 2020-06-13 | Stop reason: HOSPADM

## 2020-06-12 RX ORDER — SODIUM CHLORIDE, SODIUM LACTATE, POTASSIUM CHLORIDE, CALCIUM CHLORIDE 600; 310; 30; 20 MG/100ML; MG/100ML; MG/100ML; MG/100ML
150 INJECTION, SOLUTION INTRAVENOUS CONTINUOUS
Status: DISCONTINUED | OUTPATIENT
Start: 2020-06-12 | End: 2020-06-13 | Stop reason: HOSPADM

## 2020-06-12 RX ORDER — ALUMINA, MAGNESIA, AND SIMETHICONE 2400; 2400; 240 MG/30ML; MG/30ML; MG/30ML
15 SUSPENSION ORAL EVERY 6 HOURS PRN
Status: DISCONTINUED | OUTPATIENT
Start: 2020-06-12 | End: 2020-06-13 | Stop reason: HOSPADM

## 2020-06-12 RX ORDER — ACETAMINOPHEN 650 MG/1
650 SUPPOSITORY RECTAL EVERY 4 HOURS PRN
Status: DISCONTINUED | OUTPATIENT
Start: 2020-06-12 | End: 2020-06-13 | Stop reason: HOSPADM

## 2020-06-12 RX ORDER — ONDANSETRON 2 MG/ML
4 INJECTION INTRAMUSCULAR; INTRAVENOUS EVERY 6 HOURS PRN
Status: DISCONTINUED | OUTPATIENT
Start: 2020-06-12 | End: 2020-06-13 | Stop reason: HOSPADM

## 2020-06-12 RX ORDER — SODIUM CHLORIDE 0.9 % (FLUSH) 0.9 %
10 SYRINGE (ML) INJECTION EVERY 12 HOURS SCHEDULED
Status: DISCONTINUED | OUTPATIENT
Start: 2020-06-12 | End: 2020-06-13 | Stop reason: HOSPADM

## 2020-06-12 RX ORDER — ONDANSETRON 4 MG/1
4 TABLET, FILM COATED ORAL EVERY 6 HOURS PRN
Status: DISCONTINUED | OUTPATIENT
Start: 2020-06-12 | End: 2020-06-13 | Stop reason: HOSPADM

## 2020-06-12 RX ORDER — METHOCARBAMOL 500 MG/1
750 TABLET, FILM COATED ORAL EVERY 6 HOURS PRN
Status: DISCONTINUED | OUTPATIENT
Start: 2020-06-12 | End: 2020-06-13 | Stop reason: HOSPADM

## 2020-06-12 RX ORDER — LISINOPRIL 5 MG/1
10 TABLET ORAL DAILY
Status: DISCONTINUED | OUTPATIENT
Start: 2020-06-12 | End: 2020-06-13 | Stop reason: HOSPADM

## 2020-06-12 RX ORDER — BUSPIRONE HYDROCHLORIDE 15 MG/1
7.5 TABLET ORAL 2 TIMES DAILY
COMMUNITY

## 2020-06-12 RX ORDER — ACETAMINOPHEN 160 MG/5ML
650 SOLUTION ORAL EVERY 4 HOURS PRN
Status: DISCONTINUED | OUTPATIENT
Start: 2020-06-12 | End: 2020-06-13 | Stop reason: HOSPADM

## 2020-06-12 RX ORDER — OXYCODONE HYDROCHLORIDE 5 MG/1
5 TABLET ORAL EVERY 6 HOURS PRN
Status: DISCONTINUED | OUTPATIENT
Start: 2020-06-12 | End: 2020-06-13 | Stop reason: HOSPADM

## 2020-06-12 RX ORDER — ACETAMINOPHEN 325 MG/1
650 TABLET ORAL EVERY 4 HOURS PRN
Status: DISCONTINUED | OUTPATIENT
Start: 2020-06-12 | End: 2020-06-13 | Stop reason: HOSPADM

## 2020-06-12 RX ORDER — BISACODYL 5 MG/1
5 TABLET, DELAYED RELEASE ORAL DAILY PRN
Status: DISCONTINUED | OUTPATIENT
Start: 2020-06-12 | End: 2020-06-13 | Stop reason: HOSPADM

## 2020-06-12 RX ORDER — GABAPENTIN 400 MG/1
800 CAPSULE ORAL EVERY 8 HOURS SCHEDULED
Status: DISCONTINUED | OUTPATIENT
Start: 2020-06-12 | End: 2020-06-13 | Stop reason: HOSPADM

## 2020-06-12 RX ORDER — HYDROMORPHONE HCL 110MG/55ML
0.5 PATIENT CONTROLLED ANALGESIA SYRINGE INTRAVENOUS ONCE AS NEEDED
Status: COMPLETED | OUTPATIENT
Start: 2020-06-12 | End: 2020-06-12

## 2020-06-12 RX ORDER — SODIUM CHLORIDE 0.9 % (FLUSH) 0.9 %
10 SYRINGE (ML) INJECTION AS NEEDED
Status: DISCONTINUED | OUTPATIENT
Start: 2020-06-12 | End: 2020-06-13 | Stop reason: HOSPADM

## 2020-06-12 RX ORDER — CHOLECALCIFEROL (VITAMIN D3) 125 MCG
5 CAPSULE ORAL NIGHTLY PRN
Status: DISCONTINUED | OUTPATIENT
Start: 2020-06-12 | End: 2020-06-13 | Stop reason: HOSPADM

## 2020-06-12 RX ORDER — LEVOTHYROXINE SODIUM 0.12 MG/1
125 TABLET ORAL DAILY
Status: DISCONTINUED | OUTPATIENT
Start: 2020-06-12 | End: 2020-06-13 | Stop reason: HOSPADM

## 2020-06-12 RX ADMIN — PANCRELIPASE 12000 UNITS OF LIPASE: 60000; 12000; 38000 CAPSULE, DELAYED RELEASE PELLETS ORAL at 12:25

## 2020-06-12 RX ADMIN — PANCRELIPASE 12000 UNITS OF LIPASE: 60000; 12000; 38000 CAPSULE, DELAYED RELEASE PELLETS ORAL at 17:37

## 2020-06-12 RX ADMIN — GABAPENTIN 800 MG: 400 CAPSULE ORAL at 06:21

## 2020-06-12 RX ADMIN — TAMSULOSIN HYDROCHLORIDE 0.4 MG: 0.4 CAPSULE ORAL at 21:09

## 2020-06-12 RX ADMIN — GABAPENTIN 800 MG: 400 CAPSULE ORAL at 21:09

## 2020-06-12 RX ADMIN — OXYCODONE HYDROCHLORIDE 5 MG: 5 TABLET ORAL at 23:29

## 2020-06-12 RX ADMIN — LEVOTHYROXINE SODIUM 125 MCG: 125 TABLET ORAL at 10:13

## 2020-06-12 RX ADMIN — OXYCODONE HYDROCHLORIDE 5 MG: 5 TABLET ORAL at 11:23

## 2020-06-12 RX ADMIN — Medication 10 ML: at 10:06

## 2020-06-12 RX ADMIN — PANCRELIPASE 12000 UNITS OF LIPASE: 60000; 12000; 38000 CAPSULE, DELAYED RELEASE PELLETS ORAL at 10:07

## 2020-06-12 RX ADMIN — GABAPENTIN 800 MG: 400 CAPSULE ORAL at 14:36

## 2020-06-12 RX ADMIN — BUSPIRONE HYDROCHLORIDE 7.5 MG: 15 TABLET ORAL at 06:21

## 2020-06-12 RX ADMIN — LISINOPRIL 10 MG: 5 TABLET ORAL at 10:13

## 2020-06-12 RX ADMIN — HYDROMORPHONE HYDROCHLORIDE 0.5 MG: 2 INJECTION, SOLUTION INTRAMUSCULAR; INTRAVENOUS; SUBCUTANEOUS at 04:32

## 2020-06-12 RX ADMIN — SODIUM CHLORIDE, SODIUM LACTATE, POTASSIUM CHLORIDE, AND CALCIUM CHLORIDE 100 ML/HR: 600; 310; 30; 20 INJECTION, SOLUTION INTRAVENOUS at 04:32

## 2020-06-12 RX ADMIN — OXYCODONE HYDROCHLORIDE 5 MG: 5 TABLET ORAL at 17:36

## 2020-06-12 NOTE — H&P
[facility] Medicine Services      Patient Name: Yan Hernandez  : 1970  MRN: [id number]  Primary Care Physician: Provider, No Known  Date of admission: 2020    Patient Care Team:  Provider, No Known as PCP - General  Provider, No Known as PCP - Family Medicine          Subjective   History Present Illness     Chief Complaint:   Chief Complaint   Patient presents with   • Vomiting       Mr. Hernandez is a 50 y.o. male with a history of hypothyroidism, hypertension, pancreatitis and alcohol abuse presented to the [facility] ED on 2020 with a complaint of abdominal pain, nausea, vomiting x2 days.  Patient states abdominal pain is epigastric radiates around to both sides and down through his abdomen, intermittent, 7 out of 10, sharp.  Patient states his pain is associated with nausea and vomiting.  Patient states he drank alcohol for 25 years, has been sober for 5 years, with an occasional drink.  Patient denies alcohol use at this time.  Patient denies fever, congestion, chest pain, diarrhea.    In the ED, glucose 103, sodium 140, potassium 3.9, BUN 14, creatinine 1.09, alkaline phosphatase 77, ALT 17, AST 21, lipase 64, WBC 10.4, Hgb 14.8, HCT 42.3, platelets 266, UA trace blood otherwise negative.  CT abdomen pelvis:Trace inflammatory stranding edema in and around the head of pancreas suspicious for mild acute pancreatitis.  Small bowel is diffusely fluid-filled suggesting ileus, possibly related to pancreatitis.  Nonspecific moderate circumferential bladder wall thickening.  Thickening is increased compared to prior study, prostate is mildly prominent.  Patient received 1 L normal saline bolus, 0.5 mg Dilaudid x1 IV.  Morphine 2 mg IV x1.  Zofran 4 mg IV x1.  Patient will be admitted for further evaluation and management.         Review of Systems   Constitution: Negative.   HENT: Negative.    Eyes: Negative.    Cardiovascular: Negative.    Respiratory: Negative.     Endocrine: Negative.    Hematologic/Lymphatic: Negative.    Skin: Negative.    Musculoskeletal: Negative.    Gastrointestinal: Positive for abdominal pain, nausea and vomiting.   Genitourinary: Negative.    Neurological: Negative.    Psychiatric/Behavioral: Negative.    Allergic/Immunologic: Negative.    All other systems reviewed and are negative.          Personal History     Past Medical History:   Past Medical History:   Diagnosis Date   • History of anal fissures    • hypothyroidism    • Pancreatitis    • Substance abuse (CMS/HCC)        Surgical History:      Past Surgical History:   Procedure Laterality Date   • KNEE ACL RECONSTRUCTION     • NECK SURGERY     • NECK SURGERY  2012    C6-C7           Family History: family history is not on file. Otherwise pertinent FHx was reviewed and unremarkable.     Social History:  reports that he has been smoking cigarettes. He has a 30.00 pack-year smoking history. He has never used smokeless tobacco. He reports that he does not drink alcohol or use drugs.      Medications:  Prior to Admission medications    Medication Sig Start Date End Date Taking? Authorizing Provider   busPIRone (BUSPAR) 15 MG tablet Take 7.5 mg by mouth Every Morning.   Yes Juan Carlos Veronica MD   gabapentin (NEURONTIN) 800 MG tablet Take 800 mg by mouth 3 (Three) Times a Day.   Yes Juan Carlos Veronica MD   levothyroxine (SYNTHROID, LEVOTHROID) 100 MCG tablet Take 125 mcg by mouth Daily.   Yes Juan Carlos Veronica MD   lisinopril (PRINIVIL,ZESTRIL) 10 MG tablet Take 10 mg by mouth Daily.   Yes Juan Carlos Veronica MD   pancrelipase, Lip-Prot-Amyl, (CREON) 34298-24802 units capsule delayed-release particles capsule Take 24,000 units of lipase by mouth 3 (Three) Times a Day With Meals.   Yes Juan Carlos Veronica MD   busPIRone (BUSPAR) 15 MG tablet Take 25 mg by mouth 3 (Three) Times a Day.  6/12/20 Yes Juan Carlos Veronica MD   Pancrelipase, Lip-Prot-Amyl, (CREON) 1226-4167 units capsule  delayed-release particles capsule Take 1 capsule by mouth 3 (Three) Times a Day With Meals. 10/9/19 6/12/20  Lemuel Escalante MD       Allergies:    Allergies   Allergen Reactions   • Penicillins Anaphylaxis   • Ibuprofen GI Bleeding   • Cyclobenzaprine Rash       Objective   Objective     Vital Signs  Temp:  [98.4 °F (36.9 °C)-98.9 °F (37.2 °C)] 98.9 °F (37.2 °C)  Heart Rate:  [65-94] 65  Resp:  [16] 16  BP: (116-157)/(68-98) 157/98  SpO2:  [94 %-100 %] 99 %  on   ;   Device (Oxygen Therapy): room air  Body mass index is 27.67 kg/m².    Physical Exam   Constitutional: He is oriented to person, place, and time. He appears well-developed and well-nourished.   HENT:   Head: Normocephalic.   Eyes: Pupils are equal, round, and reactive to light.   Neck: Normal range of motion.   Cardiovascular: Normal rate, regular rhythm, normal heart sounds and intact distal pulses.   Pulmonary/Chest: Effort normal and breath sounds normal.   Abdominal: Bowel sounds are normal. There is tenderness.   Musculoskeletal: Normal range of motion.   Neurological: He is alert and oriented to person, place, and time.   Skin: Skin is warm and dry. Capillary refill takes less than 2 seconds.   Vitals reviewed.      Results Review:  I have personally reviewed most recent lab results and radiology images and interpretations and agree with findings.    Results from last 7 days   Lab Units 06/12/20  0323   WBC 10*3/mm3 11.40*   HEMOGLOBIN g/dL 14.6   HEMATOCRIT % 43.5   PLATELETS 10*3/mm3 249     Results from last 7 days   Lab Units 06/12/20  0323 06/11/20  2227   SODIUM mmol/L 140 140   POTASSIUM mmol/L 4.2 3.9   CHLORIDE mmol/L 104 104   CO2 mmol/L 26.0 22.0   BUN   --  14   CREATININE mg/dL 1.05 1.09   GLUCOSE mg/dL 85 103*   CALCIUM mg/dL 8.5* 8.9   ALT (SGPT) U/L 21 17   AST (SGOT) U/L 28 21     Estimated Creatinine Clearance: 119 mL/min (by C-G formula based on SCr of 1.05 mg/dL).  Brief Urine Lab Results  (Last result in the past 365 days)       Color   Clarity   Blood   Leuk Est   Nitrite   Protein   CREAT   Urine HCG        06/11/20 2234 Yellow Clear Trace Negative Negative Negative               Microbiology Results (last 10 days)     ** No results found for the last 240 hours. **          ECG/EMG Results (most recent)     None                    Ct Abdomen Pelvis With Contrast    Result Date: 6/11/2020  1.  Trace inflammatory stranding/edema in and around the head of the pancreas suspicious for mild acute pancreatitis. Correlate with amylase and lipase. No pseudocyst, necrosis, abscess, hemorrhage or vascular complication. No dilation of the pancreatic or common bile ducts. 2.  Diffuse fatty infiltration of the liver and mild hepatomegaly, unchanged. 3.  Nonspecific moderate circumferential bladder wall thickening. Primary differential considerations are urinary tract infection and detrusor hypertrophy from outlet obstruction. The thickening is increased compared to prior. Prostate is mildly prominent. 4.  Small bowel is diffusely fluid-filled, suggesting ileus, possibly related to pancreatitis. No evidence of mechanical bowel obstruction or inflammation. 5.  Mild sigmoid diverticulosis without diverticulitis. 6.  Mild atherosclerotic disease. 7.  Small fat-containing bilateral inguinal hernias. This examination was interpreted by Francisco Cline M.D. Electronically signed by:  Francisco Cline M.D.  6/11/2020 9:49 PM        Estimated Creatinine Clearance: 119 mL/min (by C-G formula based on SCr of 1.05 mg/dL).    Assessment/Plan   Assessment/Plan       Active Hospital Problems    Diagnosis  POA   • **Acute on chronic pancreatitis (CMS/HCC) [K85.90, K86.1]  Yes     Priority: High   • Chronic pain [G89.29]  Yes     Priority: Medium   • Hypothyroidism (acquired) [E03.9]  Yes     Priority: Medium   • Hypertensive disorder [I10]  Yes     Priority: Medium      Resolved Hospital Problems   No resolved problems to display.     Acute on chronic  pancreatitis  -Lipase 64  -CT abdomen: Trace inflammatory stranding edema in and around the head of pancreas suspicious for mild acute pancreatitis.  Small bowel is diffusely fluid-filled suggesting ileus, possibly related to pancreatitis.  Nonspecific moderate circumferential bladder wall thickening.  Thickening is increased compared to prior study, prostate is mildly prominent.  -IV fluids  mL/h  -Pain meds as needed  -Antiemetic PRN  -Recheck lipase in a.m.  -Keep n.p.o.  -Restart Creon when patient taking p.o.    Chronic pain  -Continue Neurontin, BuSpar    Hypothyroidism  -Continue levothyroxine    Hypertension  -Continue lisinopril        VTE Prophylaxis -   Mechanical Order History:      Ordered        06/12/20 0314  Place Sequential Compression Device  Once         06/12/20 0314  Maintain Sequential Compression Device  Continuous                 Pharmalogical Order History:     None          CODE STATUS:    Code Status and Medical Interventions:   Ordered at: 06/12/20 0314     Level Of Support Discussed With:    Patient     Code Status:    CPR     Medical Interventions (Level of Support Prior to Arrest):    Full           I discussed the patient's findings and my recommendations with patient.        Electronically signed by ABDI Vasquez, 06/12/20, 6:12 AM.  [facility] Hospitalist Team

## 2020-06-12 NOTE — PROGRESS NOTES
Social Work Assessment   [facility]     Patient Name: Yan Hernandez  MRN: [id number]  Today's Date: 6/12/2020    Admit Date: 6/11/2020    Discharge Plan     Row Name 06/12/20 9648       Plan    Plan  Anticipate routine d/c home. SW saw for ETOH on 6/12.         Substance Abuse     Row Name 06/12/20 2593       Substance Use    Substance Use Status  current alcohol use;past alcohol use    Environment Typically Uses Alcohol  alone;private residence    Reported Characteristics of Alcohol Use  continue despite physical/psychological problems    Readiness to Change Alcohol Use  action    Attempts to Quit Alcohol  Alcoholics Anonymous;counseling    Longest Period of Alcohol Sobriety  4.5 years    Previous Substance Use Treatment  residential;outpatient rehab;counseling;support group    Substance Use Comment  SW currently working remotely due to COVID-19 pandemic precautions. SW called pt to discuss ETOH use and treatment resources. Pt told SW that he attends AA meetings 3-4 times per week, and has been involved with AA for about five years. Pt is currently working with [employer] to regain custody of his 14-year-old daughter, and has [facility] for follow up through [facility]. Pt has been to [facility] in the recent past and plans to get reestablished with their services. Pt has also been a resident at [facility]) in the past, but was dismissed due to violating their visitor policy. Pt is currently staying with friends or in hotels, but is actively looking for permanent housing. Pt has already obtained an application for the [place] [employer] and also has contacts out for potential rental houses in the area. Pt is actively seeking ETOH treatment as well as permanent housing and denies need for further assistance at this time. SW provided emotional support and commended pt for steps he has already taken toward long term sobriety and regaining custody of his daughter.  SW encouraged pt to continue pursuing assistance from [employer].         Elin Sevilla, GINOW, LSW  PRN   Phone: [phone]

## 2020-06-12 NOTE — PLAN OF CARE
Problem: Patient Care Overview  Goal: Plan of Care Review  Outcome: Ongoing (interventions implemented as appropriate)  Flowsheets  Taken 6/12/2020 1614  Progress: no change  Outcome Summary: pt is cooperative and complains of pain. otherwise no complaints. ps is a&o. up ad bree. will continue to monitor.  Taken 6/12/2020 0701  Plan of Care Reviewed With: patient  Goal: Individualization and Mutuality  Outcome: Ongoing (interventions implemented as appropriate)  Goal: Discharge Needs Assessment  Outcome: Ongoing (interventions implemented as appropriate)  Goal: Interprofessional Rounds/Family Conf  Outcome: Ongoing (interventions implemented as appropriate)     Problem: Pancreatitis, Acute/Chronic (Adult)  Goal: Signs and Symptoms of Listed Potential Problems Will be Absent, Minimized or Managed (Pancreatitis, Acute/Chronic)  Outcome: Ongoing (interventions implemented as appropriate)     Problem: Pain, Acute (Adult)  Goal: Identify Related Risk Factors and Signs and Symptoms  Outcome: Ongoing (interventions implemented as appropriate)  Goal: Acceptable Pain Control/Comfort Level  Outcome: Ongoing (interventions implemented as appropriate)     Problem: Pain, Chronic (Adult)  Goal: Identify Related Risk Factors and Signs and Symptoms  Outcome: Ongoing (interventions implemented as appropriate)  Goal: Acceptable Pain/Comfort Level and Functional Ability  Outcome: Ongoing (interventions implemented as appropriate)

## 2020-06-12 NOTE — PLAN OF CARE
Problem: Patient Care Overview  Goal: Plan of Care Review  Outcome: Ongoing (interventions implemented as appropriate)  Flowsheets (Taken 6/12/2020 0229)  Plan of Care Reviewed With: patient  Goal: Individualization and Mutuality  Outcome: Ongoing (interventions implemented as appropriate)  Goal: Discharge Needs Assessment  Outcome: Ongoing (interventions implemented as appropriate)  Flowsheets  Taken 6/12/2020 0244  Equipment Currently Used at Home: none  Taken 6/12/2020 0245  Transportation Anticipated: car, drives self  Patient/Family Anticipated Services at Transition: none  Patient/Family Anticipates Transition to: home  Goal: Interprofessional Rounds/Family Conf  Outcome: Ongoing (interventions implemented as appropriate)     Problem: Pancreatitis, Acute/Chronic (Adult)  Goal: Signs and Symptoms of Listed Potential Problems Will be Absent, Minimized or Managed (Pancreatitis, Acute/Chronic)  Outcome: Ongoing (interventions implemented as appropriate)     Problem: Pain, Acute (Adult)  Goal: Identify Related Risk Factors and Signs and Symptoms  Outcome: Ongoing (interventions implemented as appropriate)  Goal: Acceptable Pain Control/Comfort Level  Outcome: Ongoing (interventions implemented as appropriate)  Flowsheets (Taken 6/12/2020 8916)  Acceptable Pain Control/Comfort Level: making progress toward outcome     Problem: Pain, Chronic (Adult)  Goal: Identify Related Risk Factors and Signs and Symptoms  Outcome: Ongoing (interventions implemented as appropriate)  Goal: Acceptable Pain/Comfort Level and Functional Ability  Outcome: Ongoing (interventions implemented as appropriate)  Flowsheets (Taken 6/12/2020 0696)  Acceptable Pain/Comfort Level and Functional Ability: making progress toward outcome

## 2020-06-12 NOTE — ED NOTES
Pt c/o abd pain and N/V x3 days, pt reports hx pancreatitis. Pt reports he has not had his RX x3 days as well.      Geeta Gomez, GUNJANN  06/11/20 5714

## 2020-06-12 NOTE — ED PROVIDER NOTES
Subjective   History:  She is a 50-year-old male who presents to the ER with worsening epigastric pain nausea vomiting.  He reports 2 years ago he was told he has chronic pancreatitis and he would have flares.  He reports it is been ongoing for last 4 to 5 days.  He denies any EtOH use.    Onset: 4 to 5 days  Location: Epigastric  Duration: Constant  Character: Sharp pain  Aggravating/Alleviating factors: None  Radiation none  Severity: Moderate            Review of Systems   Constitutional: Negative for chills, diaphoresis, fatigue and fever.   HENT: Negative.    Respiratory: Negative for cough and shortness of breath.    Cardiovascular: Negative for chest pain.   Gastrointestinal: Positive for abdominal pain, nausea and vomiting.   Genitourinary: Negative.    Musculoskeletal: Negative.    Skin: Negative.    Neurological: Negative.    Psychiatric/Behavioral: Negative.        Past Medical History:   Diagnosis Date   • History of anal fissures    • hypothyroidism    • Pancreatitis        Allergies   Allergen Reactions   • Penicillins Anaphylaxis   • Ibuprofen GI Bleeding   • Cyclobenzaprine Rash       Past Surgical History:   Procedure Laterality Date   • KNEE ACL RECONSTRUCTION     • NECK SURGERY     • NECK SURGERY  2012    C6-C7       No family history on file.    Social History     Socioeconomic History   • Marital status:      Spouse name: Not on file   • Number of children: Not on file   • Years of education: Not on file   • Highest education level: Not on file   Tobacco Use   • Smoking status: Current Every Day Smoker     Packs/day: 0.50   • Smokeless tobacco: Never Used   Substance and Sexual Activity   • Alcohol use: No     Frequency: Never     Comment: Previous Daily/Binge drinker   • Drug use: No   • Sexual activity: Defer           Objective   Physical Exam   Constitutional: He is oriented to person, place, and time. He appears well-developed and well-nourished.   HENT:   Head: Normocephalic and  atraumatic.   Eyes: Pupils are equal, round, and reactive to light.   Neck: Normal range of motion.   Pulmonary/Chest: Effort normal.   Abdominal: Soft. There is tenderness in the epigastric area.   Musculoskeletal: Normal range of motion.   Neurological: He is alert and oriented to person, place, and time.   Skin: Skin is warm and dry.   Psychiatric: He has a normal mood and affect. His behavior is normal.       Procedures           ED Course      Ct Abdomen Pelvis With Contrast    Result Date: 6/11/2020  1.  Trace inflammatory stranding/edema in and around the head of the pancreas suspicious for mild acute pancreatitis. Correlate with amylase and lipase. No pseudocyst, necrosis, abscess, hemorrhage or vascular complication. No dilation of the pancreatic or common bile ducts. 2.  Diffuse fatty infiltration of the liver and mild hepatomegaly, unchanged. 3.  Nonspecific moderate circumferential bladder wall thickening. Primary differential considerations are urinary tract infection and detrusor hypertrophy from outlet obstruction. The thickening is increased compared to prior. Prostate is mildly prominent. 4.  Small bowel is diffusely fluid-filled, suggesting ileus, possibly related to pancreatitis. No evidence of mechanical bowel obstruction or inflammation. 5.  Mild sigmoid diverticulosis without diverticulitis. 6.  Mild atherosclerotic disease. 7.  Small fat-containing bilateral inguinal hernias. This examination was interpreted by Francisco Cline M.D. Electronically signed by:  Francisco Cline M.D.  6/11/2020 9:49 PM    Labs Reviewed   COMPREHENSIVE METABOLIC PANEL - Abnormal; Notable for the following components:       Result Value    Glucose 103 (*)     All other components within normal limits    Narrative:     GFR Normal >60  Chronic Kidney Disease <60  Kidney Failure <15     LIPASE - Abnormal; Notable for the following components:    Lipase 64 (*)     All other components within normal limits    URINALYSIS W/ CULTURE IF INDICATED - Abnormal; Notable for the following components:    Blood, UA Trace (*)     All other components within normal limits   CBC WITH AUTO DIFFERENTIAL - Abnormal; Notable for the following components:    MCH 33.1 (*)     Lymphocytes, Absolute 4.10 (*)     All other components within normal limits   URINALYSIS, MICROSCOPIC ONLY - Abnormal; Notable for the following components:    WBC, UA 0-2 (*)     All other components within normal limits   BUN - Normal   RAINBOW DRAW    Narrative:     The following orders were created for panel order [place] Draw.  Procedure                               Abnormality         Status                     ---------                               -----------         ------                     Light Blue Top[[id number]]                                   Final result               Green Top (Gel)[[id number]]                                  Final result               Lavender Top[[id number]]                                     Final result               Gold Top - SST[[id number]]                                   Final result                 Please view results for these tests on the individual orders.   LIGHT BLUE TOP   GREEN TOP   LAVENDER TOP   GOLD TOP - SST   CBC AND DIFFERENTIAL    Narrative:     The following orders were created for panel order CBC & Differential.  Procedure                               Abnormality         Status                     ---------                               -----------         ------                     CBC Auto Differential[[id number]]        Abnormal            Final result                 Please view results for these tests on the individual orders.     Medications   sodium chloride 0.9 % flush 10 mL (has no administration in time range)   sodium chloride 0.9 % flush 10 mL (has no administration in time range)   sodium chloride 0.9 % bolus 1,000 mL (1,000 mL Intravenous New Bag 6/11/20 6454)   ondansetron (ZOFRAN)  injection 4 mg (4 mg Intravenous Given 6/11/20 2245)   Morphine sulfate (PF) injection 2 mg (2 mg Intravenous Given 6/11/20 2301)   HYDROmorphone (DILAUDID) injection 0.5 mg (0.5 mg Intravenous Given 6/11/20 2341)   iopamidol (ISOVUE-370) 76 % injection 100 mL (100 mL Intravenous Given 6/11/20 2334)                                          MDM  Number of Diagnoses or Management Options  Acute on chronic pancreatitis (CMS/HCC):   Epigastric pain:   Nausea and vomiting, intractability of vomiting not specified, unspecified vomiting type:   Diagnosis management comments: I examined the patient using the appropriate personal protective equipment.      DISPOSITION:   Chart Review:  Comorbidity:  has a past medical history of History of anal fissures, hypothyroidism, and Pancreatitis.  Differentials:this list is not all inclusive and does not constitute the entirety of considered causes --> acute on chronic pancreatitis   Labs: Lipase 64    Imaging: Was interpreted by physician and reviewed by myself:  Ct Abdomen Pelvis With Contrast    Result Date: 6/11/2020  1.  Trace inflammatory stranding/edema in and around the head of the pancreas suspicious for mild acute pancreatitis. Correlate with amylase and lipase. No pseudocyst, necrosis, abscess, hemorrhage or vascular complication. No dilation of the pancreatic or common bile ducts. 2.  Diffuse fatty infiltration of the liver and mild hepatomegaly, unchanged. 3.  Nonspecific moderate circumferential bladder wall thickening. Primary differential considerations are urinary tract infection and detrusor hypertrophy from outlet obstruction. The thickening is increased compared to prior. Prostate is mildly prominent. 4.  Small bowel is diffusely fluid-filled, suggesting ileus, possibly related to pancreatitis. No evidence of mechanical bowel obstruction or inflammation. 5.  Mild sigmoid diverticulosis without diverticulitis. 6.  Mild atherosclerotic disease. 7.  Small  fat-containing bilateral inguinal hernias. This examination was interpreted by Francisco Cline M.D. Electronically signed by:  Francisco Cline M.D.  6/11/2020 9:49 PM      Disposition/Treatment:  Patient is a 50-year-old male who presents to the ER with epigastric pain.  Patient was diagnosed 2 years ago with chronic pancreatitis.  I believe he is having acute on chronic flareup of his pancreatitis had a slight elevation in his lipase at 64.  CT scan was confirmatory for mild acute pancreatitis.  Patient was initially given morphine and Zofran without significant relief in pain.  He was given a dose of Dilaudid.  Still in significant mild pain was brought into the hospitalist for pain management and further work-up he was stable and in agreement with plan         Amount and/or Complexity of Data Reviewed  Clinical lab tests: reviewed    Patient Progress  Patient progress: stable      Final diagnoses:   Acute on chronic pancreatitis (CMS/HCC)   Epigastric pain   Nausea and vomiting, intractability of vomiting not specified, unspecified vomiting type            Lilli Shah PA-C  06/12/20 0009

## 2020-06-13 ENCOUNTER — INPATIENT HOSPITAL (OUTPATIENT)
Dept: URBAN - METROPOLITAN AREA HOSPITAL 84 | Facility: HOSPITAL | Age: 50
End: 2020-06-13
Payer: COMMERCIAL

## 2020-06-13 VITALS
OXYGEN SATURATION: 94 % | BODY MASS INDEX: 27.52 KG/M2 | HEIGHT: 75 IN | DIASTOLIC BLOOD PRESSURE: 98 MMHG | HEART RATE: 64 BPM | SYSTOLIC BLOOD PRESSURE: 152 MMHG | RESPIRATION RATE: 16 BRPM | WEIGHT: 221.34 LBS | TEMPERATURE: 98 F

## 2020-06-13 DIAGNOSIS — K21.9 GASTRO-ESOPHAGEAL REFLUX DISEASE WITHOUT ESOPHAGITIS: ICD-10-CM

## 2020-06-13 DIAGNOSIS — R93.3 ABNORMAL FINDINGS ON DIAGNOSTIC IMAGING OF OTHER PARTS OF DI: ICD-10-CM

## 2020-06-13 DIAGNOSIS — K85.90 ACUTE PANCREATITIS WITHOUT NECROSIS OR INFECTION, UNSPECIFIE: ICD-10-CM

## 2020-06-13 DIAGNOSIS — I10 ESSENTIAL (PRIMARY) HYPERTENSION: ICD-10-CM

## 2020-06-13 DIAGNOSIS — R10.13 EPIGASTRIC PAIN: ICD-10-CM

## 2020-06-13 PROCEDURE — 99238 HOSP IP/OBS DSCHRG MGMT 30/<: CPT | Performed by: HOSPITALIST

## 2020-06-13 PROCEDURE — 99223 1ST HOSP IP/OBS HIGH 75: CPT | Performed by: NURSE PRACTITIONER

## 2020-06-13 RX ORDER — TAMSULOSIN HYDROCHLORIDE 0.4 MG/1
0.4 CAPSULE ORAL NIGHTLY
Qty: 30 CAPSULE | Refills: 2 | Status: SHIPPED | OUTPATIENT
Start: 2020-06-13

## 2020-06-13 RX ORDER — MAGNESIUM CARB/ALUMINUM HYDROX 105-160MG
296 TABLET,CHEWABLE ORAL ONCE
Status: COMPLETED | OUTPATIENT
Start: 2020-06-13 | End: 2020-06-13

## 2020-06-13 RX ORDER — PANTOPRAZOLE SODIUM 40 MG/1
40 TABLET, DELAYED RELEASE ORAL
Status: DISCONTINUED | OUTPATIENT
Start: 2020-06-13 | End: 2020-06-13 | Stop reason: HOSPADM

## 2020-06-13 RX ORDER — SUCRALFATE 1 G/1
1 TABLET ORAL
Status: DISCONTINUED | OUTPATIENT
Start: 2020-06-13 | End: 2020-06-13 | Stop reason: HOSPADM

## 2020-06-13 RX ADMIN — PANCRELIPASE 12000 UNITS OF LIPASE: 60000; 12000; 38000 CAPSULE, DELAYED RELEASE PELLETS ORAL at 08:39

## 2020-06-13 RX ADMIN — LISINOPRIL 10 MG: 5 TABLET ORAL at 08:39

## 2020-06-13 RX ADMIN — SODIUM CHLORIDE, SODIUM LACTATE, POTASSIUM CHLORIDE, AND CALCIUM CHLORIDE 150 ML/HR: 600; 310; 30; 20 INJECTION, SOLUTION INTRAVENOUS at 09:18

## 2020-06-13 RX ADMIN — BUSPIRONE HYDROCHLORIDE 7.5 MG: 15 TABLET ORAL at 05:29

## 2020-06-13 RX ADMIN — LEVOTHYROXINE SODIUM 125 MCG: 125 TABLET ORAL at 08:39

## 2020-06-13 RX ADMIN — Medication 10 ML: at 08:39

## 2020-06-13 RX ADMIN — PANCRELIPASE 12000 UNITS OF LIPASE: 60000; 12000; 38000 CAPSULE, DELAYED RELEASE PELLETS ORAL at 11:31

## 2020-06-13 RX ADMIN — GABAPENTIN 800 MG: 400 CAPSULE ORAL at 14:39

## 2020-06-13 RX ADMIN — OXYCODONE HYDROCHLORIDE 5 MG: 5 TABLET ORAL at 05:29

## 2020-06-13 RX ADMIN — SUCRALFATE 1 G: 1 TABLET ORAL at 11:31

## 2020-06-13 RX ADMIN — PANTOPRAZOLE SODIUM 40 MG: 40 TABLET, DELAYED RELEASE ORAL at 11:31

## 2020-06-13 RX ADMIN — OXYCODONE HYDROCHLORIDE 5 MG: 5 TABLET ORAL at 11:31

## 2020-06-13 RX ADMIN — Medication 296 ML: at 12:28

## 2020-06-13 RX ADMIN — GABAPENTIN 800 MG: 400 CAPSULE ORAL at 05:29

## 2020-06-13 NOTE — PLAN OF CARE
Problem: Patient Care Overview  Goal: Plan of Care Review  Outcome: Ongoing (interventions implemented as appropriate)   Patient resting well through shift. Patient complained of some pain overnight and medicated per MAR.

## 2020-06-13 NOTE — CONSULTS
GI CONSULT  NOTE:    Referring Provider:    Dr Hendrix    Chief complaint:   Acute on chronic pancreatitis    Subjective  nausea, vomiting & epigastric pain     History of present illness:    Patient is a 50-year-old male with a history of chronic pancreatitis, GERD, hypertension, alcohol abuse now sober, hypothyroidism who presented to the emergency room on 6/11/2020 with a complaint of nausea and vomiting for 3 days as well as epigastric pain.  Patient states the pain is sharp in the epigastric area and radiates to bilateral upper sides and posterior thorax.  Feels like a .  Food exacerbates the pain no alleviating besides IV pain medications.  Patient states his last episode of emesis was 6/11/2020 since coming to the emergency room.  Questionable coffee-ground emesis.  States he has a history of an ulcer in the past.  Patient denies any NSAID intake.  Patient states he has not been taking any of his home medications including his omeprazole and Creon.  Patient states his last drink of alcohol was 2 days ago and was bourbon.  Patient has a history of binge drinking.  Patient has been attending AA meetings for the past 5 years.  Patient states he has been trying to attend 3 times a week but he has had difficulty with transportation.  Patient continues to smoke a fourth of a pack of cigarettes a day.  Patient states he has not had a bowel movement for 3 to 4 days which is his norm when he gets pancreatitis.    Endo History:  9/30/2019 EUS (Dr. Cruz) suspect large retention submucosal cyst versus cystic gastritis profunda.  Changes consistent with chronic pancreatitis.  Benign cells.  8/19/2019 EGD (Dr. Anthony) grade a esophagitis, 3 mm submucosal cyst in the stomach.  2016 EUS (Dr Russell) chronic pancreatitis    Past Medical History:  Past Medical History:   Diagnosis Date   • History of anal fissures    • hypothyroidism    • Pancreatitis    • Substance abuse (CMS/[facility])        Past Surgical History:  Past  Surgical History:   Procedure Laterality Date   • KNEE ACL RECONSTRUCTION     • NECK SURGERY     • NECK SURGERY  2012    C6-C7       Social History:  Social History     Tobacco Use   • Smoking status: Current Every Day Smoker     Packs/day: 1.00     Years: 30.00     Pack years: 30.00     Types: Cigarettes   • Smokeless tobacco: Never Used   Substance Use Topics   • Alcohol use: No     Frequency: Never     Comment: Previous Daily/Binge drinker x 25 years, sober for 5   • Drug use: No       Family History:  History reviewed. No pertinent family history.    Medications:  Medications Prior to Admission   Medication Sig Dispense Refill Last Dose   • busPIRone (BUSPAR) 15 MG tablet Take 7.5 mg by mouth Every Morning.   6/11/2020 at Unknown time   • gabapentin (NEURONTIN) 800 MG tablet Take 800 mg by mouth 3 (Three) Times a Day.   6/11/2020 at Unknown time   • levothyroxine (SYNTHROID, LEVOTHROID) 100 MCG tablet Take 125 mcg by mouth Daily.   6/11/2020 at Unknown time   • lisinopril (PRINIVIL,ZESTRIL) 10 MG tablet Take 10 mg by mouth Daily.   6/11/2020 at Unknown time   • pancrelipase, Lip-Prot-Amyl, (CREON) 07336-89444 units capsule delayed-release particles capsule Take 24,000 units of lipase by mouth 3 (Three) Times a Day With Meals.   Past Week at Unknown time       Scheduled Meds:  busPIRone 7.5 mg Oral QAM   gabapentin 800 mg Oral Q8H   levothyroxine 125 mcg Oral Daily   lisinopril 10 mg Oral Daily   pancrelipase (Lip-Prot-Amyl) 12,000 units of lipase Oral TID With Meals   sodium chloride 10 mL Intravenous Q12H   tamsulosin 0.4 mg Oral Nightly     Continuous Infusions:  lactated ringers 100 mL/hr Last Rate: 100 mL/hr (06/12/20 0932)     PRN Meds:.•  acetaminophen **OR** acetaminophen **OR** acetaminophen  •  aluminum-magnesium hydroxide-simethicone  •  bisacodyl  •  magnesium hydroxide  •  melatonin  •  methocarbamol  •  ondansetron **OR** ondansetron  •  oxyCODONE  •  sodium chloride  •  [COMPLETED] Insert  peripheral IV **AND** sodium chloride  •  sodium chloride    ALLERGIES:  Penicillins; Ibuprofen; and Cyclobenzaprine    ROS:  Review of Systems   Constitutional: Positive for chills. Negative for fever.   Gastrointestinal: Positive for abdominal distention (Bloating), abdominal pain, constipation, nausea and vomiting.       The following systems were reviewed and negative;   Constitution:  No fevers, chills, no unintentional weight loss  Skin: no rash, no jaundice  Eyes:  No blurry vision, no eye pain  HENT:  No change in hearing or smell  Resp:  No dyspnea or cough  CV:  No chest pain or palpitations  :  No dysuria, hematuria  Musculoskeletal:  No leg cramps or arthralgias  Neuro:  No tremor, no numbness  Psych:  No depression or confsuion    Objective  Asleep initially, resting comfortably in bed     Vital Signs:   Vitals:    06/12/20 0216 06/12/20 1004 06/12/20 2110 06/13/20 0440   BP: 157/98 131/88 142/85 152/98   BP Location: Right arm Left arm Right arm Left arm   Patient Position: Sitting Lying Lying Sitting   Pulse: 65 62 71 64   Resp: 16  18 16   Temp: 98.9 °F (37.2 °C) 97.5 °F (36.4 °C) 97.5 °F (36.4 °C) 98 °F (36.7 °C)   TempSrc: Oral Oral Oral Oral   SpO2: 99% 98% 97% 94%   Weight: 100 kg (221 lb 5.5 oz)      Height:           Physical Exam:   General Appearance:    Awake and alert, in no acute distress   Head:    Normocephalic, without obvious abnormality, atraumatic   Eyes:            Conjunctivae normal, anicteric sclera, pupils equal   Ears:    Ears appear intact with no abnormalities noted   Throat:   No oral lesions, no thrush, oral mucosa moist   Neck:   Supple, no JVD   Lungs:     Clear to auscultation bilaterally, respirations regular, even and unlabored    Heart:    Regular rhythm and normal rate, normal S1 and S2, no            Murmur appreciated   Chest Wall:    No abnormalities observed   Abdomen:     Hyperactive bowel sounds, soft, diffuse tenderness-worse RUQ, no rebound or guarding,  non-distended, no hepatosplenomegaly   Rectal:     Deferred   Extremities:   Moves all extremities, no edema, no cyanosis   Pulses:   Pulses palpable and equal bilaterally   Skin:   No rash, no jaundice, normal palpaion   Lymph nodes:   No cervical, supraclavicular or submandibular palpable adenopathy   Neurologic:   Cranial nerves 2 - 12 grossly intact, no asterixis       Results Review:   I reviewed the patient's labs and imaging.  Lab Results (last 24 hours)     ** No results found for the last 24 hours. **          Imaging Results (Last 24 Hours)     ** No results found for the last 24 hours. **             ASSESSMENT AND PLAN:  Acute on chronic pancreatitis flare  Epigastric pain consider related to acute on chronic pancreatitis  Nausea and vomiting consider related to above  Abnormal CT showing mild acute pancreatitis, fatty liver and questionable ileus  Hypertension  GERD   Hypothyroidism    PLAN:  Increase IV fluids 250 cc an hour of LR  Okay for oral meds with sips of water  PPI for GERD, add Carafate  Antiemetic and pain medications as needed   Recommend total alcohol and tobacco cessation  Supportive care     I discussed the patients findings and my recommendations with the patient.  Neeta Dove, APRN  06/13/20  07:55    Time:

## 2020-06-15 NOTE — PROGRESS NOTES
Discharge Planning Assessment  [facility]     Patient Name: Yan Hernandez  MRN: [id number]  Today's Date: 6/15/2020    Admit Date: 6/11/2020          Plan    Final Discharge Disposition Code  01 - home or self-care    Final Note  return home       Carol naegele rn  Case management  Office number [phone]  Cell phone [phone]

## 2020-06-17 ENCOUNTER — APPOINTMENT (OUTPATIENT)
Dept: GENERAL RADIOLOGY | Facility: HOSPITAL | Age: 50
End: 2020-06-17

## 2020-06-17 ENCOUNTER — HOSPITAL ENCOUNTER (EMERGENCY)
Facility: HOSPITAL | Age: 50
Discharge: HOME | End: 2020-06-17
Attending: EMERGENCY MEDICINE | Admitting: EMERGENCY MEDICINE

## 2020-06-17 VITALS
HEIGHT: 74 IN | WEIGHT: 214.29 LBS | TEMPERATURE: 98.4 F | HEART RATE: 73 BPM | OXYGEN SATURATION: 99 % | RESPIRATION RATE: 16 BRPM | SYSTOLIC BLOOD PRESSURE: 133 MMHG | DIASTOLIC BLOOD PRESSURE: 78 MMHG | BODY MASS INDEX: 27.5 KG/M2

## 2020-06-17 DIAGNOSIS — S16.1XXA STRAIN OF NECK MUSCLE, INITIAL ENCOUNTER: ICD-10-CM

## 2020-06-17 DIAGNOSIS — S86.912A STRAIN OF LEFT KNEE, INITIAL ENCOUNTER: Primary | ICD-10-CM
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
## 2020-06-17 PROCEDURE — 99284 EMERGENCY DEPT VISIT MOD MDM: CPT

## 2020-06-17 PROCEDURE — 73562 X-RAY EXAM OF KNEE 3: CPT

## 2020-06-17 PROCEDURE — 72050 X-RAY EXAM NECK SPINE 4/5VWS: CPT

## 2020-06-17 RX ORDER — HYDROCODONE BITARTRATE AND ACETAMINOPHEN 10; 325 MG/1; MG/1
1 TABLET ORAL EVERY 6 HOURS PRN
Status: DISCONTINUED | OUTPATIENT
Start: 2020-06-17 | End: 2020-06-17 | Stop reason: HOSPADM

## 2020-06-17 RX ORDER — HYDROCODONE BITARTRATE AND ACETAMINOPHEN 10; 325 MG/1; MG/1
1 TABLET ORAL EVERY 6 HOURS PRN
Qty: 12 TABLET | Refills: 0 | Status: SHIPPED | OUTPATIENT
Start: 2020-06-17

## 2020-06-17 RX ADMIN — HYDROCODONE BITARTRATE AND ACETAMINOPHEN 1 TABLET: 10; 325 TABLET ORAL at 15:46

## 2020-06-17 NOTE — ED NOTES
Pt feel and hurt his neck and back. Has a hx of fusions in his spine. Says he hit his head on the ground when he fell. He has also had surgery on his knee and his knee hit a tree when he fell. States a witness saw his knee pop out of place.      Liliana Mcfarlane, RN  06/17/20 4243

## 2020-06-17 NOTE — ED PROVIDER NOTES
Subjective   History of Present Illness  50-year-old male states he was fishing last night and he fell and hit the left side of his neck and complains of pain in the back of the neck and also twisted his left knee and has pain predominantly over the medial aspect of the knee.  He states that he has had 2 previous knee surgeries last one about a year ago on his meniscus.  He also states that he had fusion of the spine about 10 years ago.  Denies any numbness or tingling.  Patient has pain with ambulation.  Review of Systems  Negative other than noted above  Past Medical History:   Diagnosis Date   • History of anal fissures    • hypothyroidism    • Pancreatitis    • Substance abuse (CMS/[facility])        Allergies   Allergen Reactions   • Penicillins Anaphylaxis   • Ibuprofen GI Bleeding   • Cyclobenzaprine Rash       Past Surgical History:   Procedure Laterality Date   • KNEE ACL RECONSTRUCTION     • NECK SURGERY     • NECK SURGERY  2012    C6-C7       History reviewed. No pertinent family history.    Social History     Socioeconomic History   • Marital status:      Spouse name: Not on file   • Number of children: Not on file   • Years of education: Not on file   • Highest education level: Not on file   Tobacco Use   • Smoking status: Current Every Day Smoker     Packs/day: 1.00     Years: 30.00     Pack years: 30.00     Types: Cigarettes   • Smokeless tobacco: Never Used   Substance and Sexual Activity   • Alcohol use: No     Frequency: Never     Comment: Previous Daily/Binge drinker x 25 years, sober for 5   • Drug use: No   • Sexual activity: Defer           Objective   Physical Exam  Patient is awake and alert she is afebrile vital signs are stable the HEENT exam shows no evidence of any trauma the neck reveals some tenderness in the left posterior lateral area there is no step-off deformity or crepitance noted.  The patient has no evidence of trauma to the chest of the upper extremities.  Pelvis is stable the  patient has no obvious deformity to his left knee but he has tenderness noted over the medial aspect as well as some pain with a valgus stress although the knee joint is stable and there appears to be no effusion.  Procedures           ED Course                Results for orders placed or performed during the hospital encounter of 06/11/20   Comprehensive Metabolic Panel   Result Value Ref Range    Glucose 103 (H) 65 - 99 mg/dL    BUN      Creatinine 1.09 0.76 - 1.27 mg/dL    Sodium 140 136 - 145 mmol/L    Potassium 3.9 3.5 - 5.2 mmol/L    Chloride 104 98 - 107 mmol/L    CO2 22.0 22.0 - 29.0 mmol/L    Calcium 8.9 8.6 - 10.5 mg/dL    Total Protein 8.1 6.0 - 8.5 g/dL    Albumin 4.40 3.50 - 5.20 g/dL    ALT (SGPT) 17 1 - 41 U/L    AST (SGOT) 21 1 - 40 U/L    Alkaline Phosphatase 77 39 - 117 U/L    Total Bilirubin 0.2 0.2 - 1.2 mg/dL    eGFR Non African Amer 72 >60 mL/min/1.73    Globulin 3.7 gm/dL    A/G Ratio 1.2 g/dL    BUN/Creatinine Ratio      Anion Gap 14.0 5.0 - 15.0 mmol/L   Lipase   Result Value Ref Range    Lipase 64 (H) 13 - 60 U/L   Urinalysis With Culture If Indicated - Urine, Clean Catch   Result Value Ref Range    Color, UA Yellow Yellow, Straw    Appearance, UA Clear Clear    pH, UA 6.5 5.0 - 8.0    Specific Gravity, UA 1.019 1.005 - 1.030    Glucose, UA Negative Negative    Ketones, UA Negative Negative    Bilirubin, UA Negative Negative    Blood, UA Trace (A) Negative    Protein, UA Negative Negative    Leuk Esterase, UA Negative Negative    Nitrite, UA Negative Negative    Urobilinogen, UA 0.2 E.U./dL 0.2 - 1.0 E.U./dL   CBC Auto Differential   Result Value Ref Range    WBC 10.40 3.40 - 10.80 10*3/mm3    RBC 4.48 4.14 - 5.80 10*6/mm3    Hemoglobin 14.8 13.0 - 17.7 g/dL    Hematocrit 42.3 37.5 - 51.0 %    MCV 94.4 79.0 - 97.0 fL    MCH 33.1 (H) 26.6 - 33.0 pg    MCHC 35.0 31.5 - 35.7 g/dL    RDW 14.5 12.3 - 15.4 %    RDW-SD 47.7 37.0 - 54.0 fl    MPV 8.4 6.0 - 12.0 fL    Platelets 266 140 - 450  10*3/mm3    Neutrophil % 51.1 42.7 - 76.0 %    Lymphocyte % 39.5 19.6 - 45.3 %    Monocyte % 6.9 5.0 - 12.0 %    Eosinophil % 1.5 0.3 - 6.2 %    Basophil % 1.0 0.0 - 1.5 %    Neutrophils, Absolute 5.30 1.70 - 7.00 10*3/mm3    Lymphocytes, Absolute 4.10 (H) 0.70 - 3.10 10*3/mm3    Monocytes, Absolute 0.70 0.10 - 0.90 10*3/mm3    Eosinophils, Absolute 0.20 0.00 - 0.40 10*3/mm3    Basophils, Absolute 0.10 0.00 - 0.20 10*3/mm3    nRBC 0.1 0.0 - 0.2 /100 WBC   Urinalysis, Microscopic Only - Urine, Clean Catch   Result Value Ref Range    RBC, UA None Seen None Seen /HPF    WBC, UA 0-2 (A) None Seen /HPF    Bacteria, UA None Seen None Seen /HPF    Squamous Epithelial Cells, UA None Seen None Seen, 0-2 /HPF    Hyaline Casts, UA None Seen None Seen /LPF    Methodology Automated Microscopy    BUN   Result Value Ref Range    BUN 14 6 - 20 mg/dL   CBC Auto Differential   Result Value Ref Range    WBC 11.40 (H) 3.40 - 10.80 10*3/mm3    RBC 4.58 4.14 - 5.80 10*6/mm3    Hemoglobin 14.6 13.0 - 17.7 g/dL    Hematocrit 43.5 37.5 - 51.0 %    MCV 95.1 79.0 - 97.0 fL    MCH 31.9 26.6 - 33.0 pg    MCHC 33.6 31.5 - 35.7 g/dL    RDW 14.5 12.3 - 15.4 %    RDW-SD 48.1 37.0 - 54.0 fl    MPV 8.2 6.0 - 12.0 fL    Platelets 249 140 - 450 10*3/mm3    Neutrophil % 50.2 42.7 - 76.0 %    Lymphocyte % 39.5 19.6 - 45.3 %    Monocyte % 8.0 5.0 - 12.0 %    Eosinophil % 1.6 0.3 - 6.2 %    Basophil % 0.7 0.0 - 1.5 %    Neutrophils, Absolute 5.70 1.70 - 7.00 10*3/mm3    Lymphocytes, Absolute 4.50 (H) 0.70 - 3.10 10*3/mm3    Monocytes, Absolute 0.90 0.10 - 0.90 10*3/mm3    Eosinophils, Absolute 0.20 0.00 - 0.40 10*3/mm3    Basophils, Absolute 0.10 0.00 - 0.20 10*3/mm3    nRBC 0.1 0.0 - 0.2 /100 WBC   Comprehensive Metabolic Panel   Result Value Ref Range    Glucose 85 65 - 99 mg/dL    BUN      Creatinine 1.05 0.76 - 1.27 mg/dL    Sodium 140 136 - 145 mmol/L    Potassium 4.2 3.5 - 5.2 mmol/L    Chloride 104 98 - 107 mmol/L    CO2 26.0 22.0 - 29.0 mmol/L     Calcium 8.5 (L) 8.6 - 10.5 mg/dL    Total Protein 7.4 6.0 - 8.5 g/dL    Albumin 4.00 3.50 - 5.20 g/dL    ALT (SGPT) 21 1 - 41 U/L    AST (SGOT) 28 1 - 40 U/L    Alkaline Phosphatase 73 39 - 117 U/L    Total Bilirubin 0.3 0.2 - 1.2 mg/dL    eGFR Non African Amer 75 >60 mL/min/1.73    Globulin 3.4 gm/dL    A/G Ratio 1.2 g/dL    BUN/Creatinine Ratio      Anion Gap 10.0 5.0 - 15.0 mmol/L   BUN   Result Value Ref Range    BUN 13 6 - 20 mg/dL   Lipase   Result Value Ref Range    Lipase 271 (H) 13 - 60 U/L   POC Glucose Once   Result Value Ref Range    Glucose 76 70 - 105 mg/dL   Light Blue Top   Result Value Ref Range    Extra Tube hold for add-on    Green Top (Gel)   Result Value Ref Range    Extra Tube      Lavender Top   Result Value Ref Range    Extra Tube done    Gold Top - SST   Result Value Ref Range    Extra Tube Hold for add-ons.      Medications   HYDROcodone-acetaminophen (NORCO)  MG per tablet 1 tablet (1 tablet Oral Given 6/17/20 1546)     Xr Spine Cervical Complete 4 Or 5 View    Result Date: 6/17/2020  1. Negative for fracture or subluxation. 2. Stable degenerative and postoperative changes.  Electronically Signed By-Melissa Eastman On:6/17/2020 4:27 PM This report was finalized on [id number] by  Melissa Eastman, .    Xr Knee 3 View Left    Result Date: 6/17/2020   1. No acute osseous abnormalities of the left knee. 2. Old healed fractures of the patella, lateral tibia, as was described on 2017. 3. Small superimposed patellar effusion. 4. Mild degenerative change of the patellofemoral and medial compartments. 5. Signs prior ACL repair.  Electronically Signed By-Dr. Luann De Jesus MD On:6/17/2020 4:30 PM This report was finalized on [id number] by Dr. Luann De Jesus MD.                               MDM  Patient's x-rays of the knee revealed chronic changes and postoperative changes.  The same goes for the CT of the cervical spine.  I do not see any acute injury.  The patient will be placed in a knee  immobilizer and given crutches.  He is to follow-up with Dr. Aceves if he has persistent problems.  The patient was given 3 days of Norco.  Final diagnoses:   Fall, initial encounter   Strain of neck muscle, initial encounter   Strain of left knee, initial encounter            Wyatt Shea MD  06/17/20 1640

## 2020-06-17 NOTE — DISCHARGE INSTRUCTIONS
Wear knee immobilizer and use crutches until pain improves.  Elevate with an ice pack over the next few days.  Use [place] for pain.  Call Dr. Aceves's office if not improved over the next 3 days.   2 seconds or less

## 2020-06-22 NOTE — DISCHARGE SUMMARY
[facility] Medicine Services  DISCHARGE SUMMARY        Prepared For PCP:  Provider, No Known    Patient Name: Yan Hernandez  : 1970  MRN: [id number]      Date of Admission:  2020    Date of Discharge: 2020    Length of stay:  LOS: 1 day     Hospital Course     Presenting Problem:   Epigastric pain [R10.13]  Acute on chronic pancreatitis (CMS/HCC) [K85.90, K86.1]  Nausea and vomiting, intractability of vomiting not specified, unspecified vomiting type [R11.2]  Acute on chronic pancreatitis (CMS/HCC) [K85.90, K86.1]      Active Hospital Problems    Diagnosis  POA   • **Acute on chronic pancreatitis (CMS/HCC) [K85.90, K86.1]  Yes   • Chronic pain [G89.29]  Yes   • Hypothyroidism (acquired) [E03.9]  Yes   • Hypertensive disorder [I10]  Yes      Resolved Hospital Problems   No resolved problems to display.       Hospital Course:  Yan Hernandez is a 50 y.o. male admitted with:    Acute on chronic pancreatitis  -Lipase 64  -CT abdomen: Trace inflammatory stranding edema in and around the head of pancreas suspicious for mild acute pancreatitis.  Small bowel is diffusely fluid-filled suggesting ileus, possibly related to pancreatitis.  Nonspecific moderate circumferential bladder wall thickening.  Thickening is increased compared to prior study, prostate is mildly prominent.  -IV fluids  mL/h  -Pain meds as needed  -Antiemetic PRN  -Recheck lipase in a.m.  -Keep n.p.o.  -Restart Creon when patient taking p.o.     Chronic pain  -Continue Neurontin, BuSpar     Hypothyroidism  -Continue levothyroxine     Hypertension  -Continue lisinopril     Discharged in stable condition, tolerating regular diet prior to discharge    Recommendation for Outpatient Providers:             Reasons For Change In Medications and Indications for New Medications:        Day of Discharge     HPI:   No complaints     Vital Signs:   Vitals:    20 0440   BP: 152/98   Pulse: 64   Resp: 16   Temp:  98 °F (36.7 °C)   SpO2: 94%     Stable    Physical Exam:  Physical Exam  Unchanged from previously documented physical exam by hospitalist provider     Pertinent  and/or Most Recent Results               Invalid input(s): PROT, LABALBU        Invalid input(s): TG, LDLCALC, LDLREALC        Brief Urine Lab Results  (Last result in the past 365 days)      Color   Clarity   Blood   Leuk Est   Nitrite   Protein   CREAT   Urine HCG        06/11/20 2234 Yellow Clear Trace Negative Negative Negative               Microbiology Results Abnormal     None          Xr Spine Cervical Complete 4 Or 5 View    Result Date: 6/17/2020  Impression: 1. Negative for fracture or subluxation. 2. Stable degenerative and postoperative changes.  Electronically Signed By-Melissa Eastman On:6/17/2020 4:27 PM This report was finalized on [id number] by  Melissa Eastman, .    Xr Knee 3 View Left    Result Date: 6/17/2020  Impression:  1. No acute osseous abnormalities of the left knee. 2. Old healed fractures of the patella, lateral tibia, as was described on 2017. 3. Small superimposed patellar effusion. 4. Mild degenerative change of the patellofemoral and medial compartments. 5. Signs prior ACL repair.  Electronically Signed By-Dr. Luann De Jesus MD On:6/17/2020 4:30 PM This report was finalized on [id number] by Dr. Luann De Jesus MD.    Ct Abdomen Pelvis With Contrast    Result Date: 6/11/2020  Impression: 1.  Trace inflammatory stranding/edema in and around the head of the pancreas suspicious for mild acute pancreatitis. Correlate with amylase and lipase. No pseudocyst, necrosis, abscess, hemorrhage or vascular complication. No dilation of the pancreatic or common bile ducts. 2.  Diffuse fatty infiltration of the liver and mild hepatomegaly, unchanged. 3.  Nonspecific moderate circumferential bladder wall thickening. Primary differential considerations are urinary tract infection and detrusor hypertrophy from outlet obstruction. The  thickening is increased compared to prior. Prostate is mildly prominent. 4.  Small bowel is diffusely fluid-filled, suggesting ileus, possibly related to pancreatitis. No evidence of mechanical bowel obstruction or inflammation. 5.  Mild sigmoid diverticulosis without diverticulitis. 6.  Mild atherosclerotic disease. 7.  Small fat-containing bilateral inguinal hernias. This examination was interpreted by Francisco Cline M.D. Electronically signed by:  Francisco Cline M.D.  6/11/2020 9:49 PM                             Test Results Pending at Discharge        Procedures Performed           Consults:   Consults     Date and Time Order Name Status Description    6/12/2020 0001 Hospitalist (on-call MD unless specified) Completed             Discharge Details        Discharge Medications      New Medications      Instructions Start Date   tamsulosin 0.4 MG capsule 24 hr capsule  Commonly known as:  FLOMAX   0.4 mg, Oral, Nightly         Continue These Medications      Instructions Start Date   busPIRone 15 MG tablet  Commonly known as:  BUSPAR   7.5 mg, Oral, Every Morning      gabapentin 800 MG tablet  Commonly known as:  NEURONTIN   800 mg, Oral, 3 Times Daily      levothyroxine 100 MCG tablet  Commonly known as:  SYNTHROID, LEVOTHROID   125 mcg, Oral, Daily      lisinopril 10 MG tablet  Commonly known as:  PRINIVIL,ZESTRIL   10 mg, Oral, Daily      pancrelipase (Lip-Prot-Amyl) 67545-65078 units capsule delayed-release particles capsule  Commonly known as:  CREON   24,000 units of lipase, Oral, 3 Times Daily With Meals             Allergies   Allergen Reactions   • Penicillins Anaphylaxis   • Ibuprofen GI Bleeding   • Cyclobenzaprine Rash         Discharge Disposition:  Home or Self Care    Diet:  Hospital:No active diet order        Discharge Activity:     Activity as tolerated     CODE STATUS:    Code Status and Medical Interventions:   Ordered at: 06/12/20 0514     Level Of Support Discussed With:    Patient      Code Status:    CPR     Medical Interventions (Level of Support Prior to Arrest):    Full         Follow-up Appointments  No future appointments.          Condition on Discharge:      Stable          Electronically signed by Mona Hendrix MD, 06/22/20, 11:57 AM.    Time: I spent  20  minutes on this discharge activity which included face-to-face encounter with the patient/reviewing the data in the system/coordination of the care with the nursing staff as well as consultants/documentation/entering orders.

## 2020-07-02 ENCOUNTER — OFFICE (OUTPATIENT)
Dept: URBAN - METROPOLITAN AREA CLINIC 64 | Facility: CLINIC | Age: 50
End: 2020-07-02
Payer: MEDICARE

## 2020-07-02 VITALS
WEIGHT: 223 LBS | HEART RATE: 67 BPM | SYSTOLIC BLOOD PRESSURE: 113 MMHG | HEIGHT: 75 IN | DIASTOLIC BLOOD PRESSURE: 73 MMHG

## 2020-07-02 DIAGNOSIS — K59.00 CONSTIPATION, UNSPECIFIED: ICD-10-CM

## 2020-07-02 DIAGNOSIS — F10.10 ALCOHOL ABUSE, UNCOMPLICATED: ICD-10-CM

## 2020-07-02 DIAGNOSIS — R11.0 NAUSEA: ICD-10-CM

## 2020-07-02 DIAGNOSIS — K30 FUNCTIONAL DYSPEPSIA: ICD-10-CM

## 2020-07-02 DIAGNOSIS — K86.1 OTHER CHRONIC PANCREATITIS: ICD-10-CM

## 2020-07-02 PROCEDURE — 99214 OFFICE O/P EST MOD 30 MIN: CPT | Performed by: NURSE PRACTITIONER

## 2020-07-02 RX ORDER — AMITRIPTYLINE HYDROCHLORIDE 50 MG/1
50 TABLET, FILM COATED ORAL
Qty: 30 | Refills: 6 | Status: ACTIVE
Start: 2020-07-02

## 2020-07-02 RX ORDER — PANTOPRAZOLE SODIUM 40 MG/1
40 TABLET, DELAYED RELEASE ORAL
Qty: 90 | Refills: 3 | Status: ACTIVE
Start: 2020-07-02

## 2020-09-14 ENCOUNTER — APPOINTMENT (OUTPATIENT)
Dept: CT IMAGING | Facility: HOSPITAL | Age: 50
End: 2020-09-14

## 2020-09-14 ENCOUNTER — HOSPITAL ENCOUNTER (EMERGENCY)
Facility: HOSPITAL | Age: 50
Discharge: HOME | End: 2020-09-14
Admitting: EMERGENCY MEDICINE

## 2020-09-14 VITALS
RESPIRATION RATE: 18 BRPM | DIASTOLIC BLOOD PRESSURE: 90 MMHG | TEMPERATURE: 98.6 F | HEIGHT: 74 IN | HEART RATE: 63 BPM | OXYGEN SATURATION: 98 % | BODY MASS INDEX: 30.27 KG/M2 | WEIGHT: 235.89 LBS | SYSTOLIC BLOOD PRESSURE: 127 MMHG

## 2020-09-14 DIAGNOSIS — R10.10 PAIN OF UPPER ABDOMEN: Primary | ICD-10-CM

## 2020-09-14 LAB
ALBUMIN SERPL-MCNC: 4.4 G/DL (ref 3.5–5.2)
ALBUMIN/GLOB SERPL: 1.4 G/DL
ALP SERPL-CCNC: 62 U/L (ref 39–117)
ALT SERPL W P-5'-P-CCNC: 18 U/L (ref 1–41)
ANION GAP SERPL CALCULATED.3IONS-SCNC: 11 MMOL/L (ref 5–15)
AST SERPL-CCNC: 35 U/L (ref 1–40)
BASOPHILS # BLD AUTO: 0.1 10*3/MM3 (ref 0–0.2)
BASOPHILS NFR BLD AUTO: 1 % (ref 0–1.5)
BILIRUB SERPL-MCNC: 0.2 MG/DL (ref 0–1.2)
BILIRUB UR QL STRIP: NEGATIVE
BUN SERPL-MCNC: 14 MG/DL (ref 6–20)
BUN SERPL-MCNC: ABNORMAL MG/DL
BUN/CREAT SERPL: ABNORMAL
CALCIUM SPEC-SCNC: 9.2 MG/DL (ref 8.6–10.5)
CHLORIDE SERPL-SCNC: 101 MMOL/L (ref 98–107)
CLARITY UR: CLEAR
CO2 SERPL-SCNC: 25 MMOL/L (ref 22–29)
COLOR UR: YELLOW
CREAT SERPL-MCNC: 1.22 MG/DL (ref 0.76–1.27)
DEPRECATED RDW RBC AUTO: 54.7 FL (ref 37–54)
EOSINOPHIL # BLD AUTO: 0.2 10*3/MM3 (ref 0–0.4)
EOSINOPHIL NFR BLD AUTO: 2.3 % (ref 0.3–6.2)
ERYTHROCYTE [DISTWIDTH] IN BLOOD BY AUTOMATED COUNT: 16.4 % (ref 12.3–15.4)
ETHANOL UR QL: <0.01 %
GFR SERPL CREATININE-BSD FRML MDRD: 63 ML/MIN/1.73
GLOBULIN UR ELPH-MCNC: 3.1 GM/DL
GLUCOSE SERPL-MCNC: 129 MG/DL (ref 65–99)
GLUCOSE UR STRIP-MCNC: NEGATIVE MG/DL
HCT VFR BLD AUTO: 42.8 % (ref 37.5–51)
HGB BLD-MCNC: 14.3 G/DL (ref 13–17.7)
HGB UR QL STRIP.AUTO: NEGATIVE
KETONES UR QL STRIP: NEGATIVE
LEUKOCYTE ESTERASE UR QL STRIP.AUTO: NEGATIVE
LIPASE SERPL-CCNC: 43 U/L (ref 13–60)
LYMPHOCYTES # BLD AUTO: 3 10*3/MM3 (ref 0.7–3.1)
LYMPHOCYTES NFR BLD AUTO: 34.5 % (ref 19.6–45.3)
MCH RBC QN AUTO: 32.4 PG (ref 26.6–33)
MCHC RBC AUTO-ENTMCNC: 33.5 G/DL (ref 31.5–35.7)
MCV RBC AUTO: 96.8 FL (ref 79–97)
MONOCYTES # BLD AUTO: 0.6 10*3/MM3 (ref 0.1–0.9)
MONOCYTES NFR BLD AUTO: 6.8 % (ref 5–12)
NEUTROPHILS NFR BLD AUTO: 4.8 10*3/MM3 (ref 1.7–7)
NEUTROPHILS NFR BLD AUTO: 55.4 % (ref 42.7–76)
NITRITE UR QL STRIP: NEGATIVE
NRBC BLD AUTO-RTO: 0.1 /100 WBC (ref 0–0.2)
PH UR STRIP.AUTO: 7 [PH] (ref 5–8)
PLATELET # BLD AUTO: 299 10*3/MM3 (ref 140–450)
PMV BLD AUTO: 8.5 FL (ref 6–12)
POTASSIUM SERPL-SCNC: 3.9 MMOL/L (ref 3.5–5.2)
PROT SERPL-MCNC: 7.5 G/DL (ref 6–8.5)
PROT UR QL STRIP: NEGATIVE
RBC # BLD AUTO: 4.42 10*6/MM3 (ref 4.14–5.8)
SODIUM SERPL-SCNC: 137 MMOL/L (ref 136–145)
SP GR UR STRIP: 1.01 (ref 1–1.03)
UROBILINOGEN UR QL STRIP: NORMAL
WBC # BLD AUTO: 8.7 10*3/MM3 (ref 3.4–10.8)

## 2020-09-14 PROCEDURE — 85025 COMPLETE CBC W/AUTO DIFF WBC: CPT | Performed by: NURSE PRACTITIONER

## 2020-09-14 PROCEDURE — 80307 DRUG TEST PRSMV CHEM ANLYZR: CPT | Performed by: NURSE PRACTITIONER

## 2020-09-14 PROCEDURE — 96375 TX/PRO/DX INJ NEW DRUG ADDON: CPT

## 2020-09-14 PROCEDURE — 99284 EMERGENCY DEPT VISIT MOD MDM: CPT

## 2020-09-14 PROCEDURE — 96374 THER/PROPH/DIAG INJ IV PUSH: CPT

## 2020-09-14 PROCEDURE — 80053 COMPREHEN METABOLIC PANEL: CPT | Performed by: NURSE PRACTITIONER

## 2020-09-14 PROCEDURE — 83690 ASSAY OF LIPASE: CPT | Performed by: NURSE PRACTITIONER

## 2020-09-14 PROCEDURE — 0 IOPAMIDOL PER 1 ML: Performed by: NURSE PRACTITIONER

## 2020-09-14 PROCEDURE — 81003 URINALYSIS AUTO W/O SCOPE: CPT | Performed by: NURSE PRACTITIONER

## 2020-09-14 PROCEDURE — 74177 CT ABD & PELVIS W/CONTRAST: CPT

## 2020-09-14 PROCEDURE — 25010000002 MORPHINE PER 10 MG: Performed by: NURSE PRACTITIONER

## 2020-09-14 PROCEDURE — 25010000002 ONDANSETRON PER 1 MG: Performed by: NURSE PRACTITIONER

## 2020-09-14 PROCEDURE — 25010000002 KETOROLAC TROMETHAMINE PER 15 MG: Performed by: NURSE PRACTITIONER

## 2020-09-14 RX ORDER — ONDANSETRON 2 MG/ML
4 INJECTION INTRAMUSCULAR; INTRAVENOUS ONCE
Status: COMPLETED | OUTPATIENT
Start: 2020-09-14 | End: 2020-09-14

## 2020-09-14 RX ORDER — SODIUM CHLORIDE 0.9 % (FLUSH) 0.9 %
10 SYRINGE (ML) INJECTION AS NEEDED
Status: DISCONTINUED | OUTPATIENT
Start: 2020-09-14 | End: 2020-09-15 | Stop reason: HOSPADM

## 2020-09-14 RX ORDER — KETOROLAC TROMETHAMINE 15 MG/ML
15 INJECTION, SOLUTION INTRAMUSCULAR; INTRAVENOUS ONCE
Status: COMPLETED | OUTPATIENT
Start: 2020-09-14 | End: 2020-09-14

## 2020-09-14 RX ORDER — PRAMOXINE HYDROCHLORIDE 10 MG/G
AEROSOL, FOAM TOPICAL
Qty: 15 G | Refills: 0 | Status: SHIPPED | OUTPATIENT
Start: 2020-09-14

## 2020-09-14 RX ORDER — MORPHINE SULFATE 4 MG/ML
4 INJECTION, SOLUTION INTRAMUSCULAR; INTRAVENOUS ONCE
Status: COMPLETED | OUTPATIENT
Start: 2020-09-14 | End: 2020-09-14

## 2020-09-14 RX ADMIN — IOPAMIDOL 100 ML: 755 INJECTION, SOLUTION INTRAVENOUS at 22:47

## 2020-09-14 RX ADMIN — KETOROLAC TROMETHAMINE 15 MG: 15 INJECTION, SOLUTION INTRAMUSCULAR; INTRAVENOUS at 23:40

## 2020-09-14 RX ADMIN — SODIUM CHLORIDE 500 ML: 0.9 INJECTION, SOLUTION INTRAVENOUS at 21:00

## 2020-09-14 RX ADMIN — ONDANSETRON 4 MG: 2 INJECTION INTRAMUSCULAR; INTRAVENOUS at 20:44

## 2020-09-14 RX ADMIN — MORPHINE SULFATE 4 MG: 4 INJECTION INTRAVENOUS at 20:44

## 2020-09-15 NOTE — ED PROVIDER NOTES
Subjective   Patient is a 50-year-old white male with history of pancreatitis who presents today with complaints of upper abdominal pain.  He states his pain started a couple of days ago.  He states it feels similar to the pain he had with his prior pancreatitis flareups.  Does report that his symptoms are worse when he eats.  He denies any radiation of his pain.  He reports nausea but denies any vomiting or diarrhea.  He states he does have some pain down into his lower abdomen.  He states 2 days ago he noticed some streaks of bright red blood in his stool.  He states he has had some constipation.  He denies any further bleeding.  He denies any fever chills.  Denies any chest pain shortness breath dizziness or lightheadedness.  He denies any blood thinner use.          Review of Systems   Constitutional: Negative for chills and fever.   Gastrointestinal: Positive for abdominal pain, blood in stool, constipation and nausea. Negative for diarrhea and vomiting.   Genitourinary: Negative for decreased urine volume, dysuria, frequency, scrotal swelling, testicular pain and urgency.   Neurological: Negative for dizziness, weakness and light-headedness.       Past Medical History:   Diagnosis Date   • History of anal fissures    • hypothyroidism    • Pancreatitis    • Substance abuse (CMS/[facility])        Allergies   Allergen Reactions   • Penicillins Anaphylaxis   • Ibuprofen GI Bleeding   • Cyclobenzaprine Rash       Past Surgical History:   Procedure Laterality Date   • KNEE ACL RECONSTRUCTION     • NECK SURGERY     • NECK SURGERY  2012    C6-C7       No family history on file.    Social History     Socioeconomic History   • Marital status:      Spouse name: Not on file   • Number of children: Not on file   • Years of education: Not on file   • Highest education level: Not on file   Tobacco Use   • Smoking status: Current Every Day Smoker     Packs/day: 1.00     Years: 30.00     Pack years: 30.00     Types:  Cigarettes   • Smokeless tobacco: Never Used   Substance and Sexual Activity   • Alcohol use: No     Frequency: Never     Comment: Previous Daily/Binge drinker x 25 years, sober for 5   • Drug use: No   • Sexual activity: Defer           Objective   Physical Exam  Vital signs and triage nurse note reviewed.  Constitutional: Awake, alert; well-developed and well-nourished. No acute distress is noted.  HEENT: Normocephalic, atraumatic; pupils are PERRL with intact EOM; oropharynx is pink and moist without exudate or erythema.  No drooling or pooling of oral secretions.  Neck: Supple, full range of motion without pain; no cervical lymphadenopathy. Normal phonation.  Cardiovascular: Regular rate and rhythm, normal S1-S2.  No murmur noted.  Pulmonary: Respiratory effort regular nonlabored, breath sounds clear to auscultation all fields.  Abdomen: Soft, tender over the upper abdomen, nondistended with normoactive bowel sounds; no rebound or guarding.  Musculoskeletal: Independent range of motion of all extremities with no palpable tenderness or edema.  Neuro: Alert oriented x3, speech is clear and appropriate, GCS 15.    Skin: Flesh tone, warm, dry, intact; no erythematous or petechial rash or lesion.      Procedures           ED Course      Labs Reviewed   COMPREHENSIVE METABOLIC PANEL - Abnormal; Notable for the following components:       Result Value    Glucose 129 (*)     All other components within normal limits    Narrative:     GFR Normal >60  Chronic Kidney Disease <60  Kidney Failure <15     CBC WITH AUTO DIFFERENTIAL - Abnormal; Notable for the following components:    RDW 16.4 (*)     RDW-SD 54.7 (*)     All other components within normal limits   LIPASE - Normal   URINALYSIS W/ CULTURE IF INDICATED - Normal    Narrative:     Urine microscopic not indicated.   BUN - Normal   ETHANOL    Narrative:     Plasma Ethanol Clinical Symptoms:    ETOH (%)               Clinical Symptom  .01-.05              No apparent  influence  .03-.12              Euphoria, Diminished judgment and attention   .09-.25              Impaired comprehension, Muscle incoordination  .18-.30              Confusion, Staggered gait, Slurred speech  .25-.40              Markedly decreased response to stimuli, unable to stand or                        walk, vomitting, sleep or stupor  .35-.50              Comatose, Anesthesia, Subnormal body temperature       CBC AND DIFFERENTIAL    Narrative:     The following orders were created for panel order CBC & Differential.  Procedure                               Abnormality         Status                     ---------                               -----------         ------                     CBC Auto Differential[[id number]]        Abnormal            Final result                 Please view results for these tests on the individual orders.     Ct Abdomen Pelvis With Contrast    Result Date: 9/14/2020  1.No evidence for acute abnormality throughout the abdomen or pelvis. 2.Mild prominence of the prostate gland. Recommend correlation patient's PSA levels.  Electronically Signed By-Francisco Lundberg On:9/14/2020 10:54 PM This report was finalized on [id number] by  Francisco Lundberg, .    Medications   sodium chloride 0.9 % flush 10 mL (has no administration in time range)   ketorolac (TORADOL) injection 15 mg (has no administration in time range)   sodium chloride 0.9 % bolus 500 mL (500 mL Intravenous New Bag 9/14/20 2100)   Morphine sulfate (PF) injection 4 mg (4 mg Intravenous Given 9/14/20 2044)   ondansetron (ZOFRAN) injection 4 mg (4 mg Intravenous Given 9/14/20 2044)   iopamidol (ISOVUE-370) 76 % injection 100 mL (100 mL Intravenous Given 9/14/20 2247)                                          MDM  Number of Diagnoses or Management Options  Pain of upper abdomen:   Diagnosis management comments: Comorbidities: Pancreatitis  Differentials: Pancreatitis, gastritis, diverticulitis, kidney stone, pyelonephritis,  UTI;this list is not all inclusive and does not constitute the entirety of considered causes  Discussion with provider:  Radiology interpretation: X-rays reviewed by me and interpreted by radiologist: As above  Lab interpretation: Labs viewed by me significant for: As above    Patient had IV established.  He had labs and CT obtained.  He was given fluid bolus as well as morphine and Zofran for pain and nausea.    I was notified by the patient's nurse that the patient is requesting more pain medication.  He was given Toradol.      Patient has no evidence of acute pancreatitis on work-up today.  Lab work-up was unremarkable.  No acute findings on patient's CT scan today.  He is well-appearing, in no acute distress and with stable vital signs.  Patient is requesting prescription medication to help with inflammation from his anal fissures.    Diagnosis and treatment plan discussed with patient.  Patient agreeable to plan.   I discussed findings with patient who voices understanding of discharge instructions, signs and symptoms requiring return to ED; discharged improved and in stable condition with follow up for re-evaluation.  Prescription for Proctofoam.         Amount and/or Complexity of Data Reviewed  Clinical lab tests: ordered and reviewed  Tests in the radiology section of CPT®: ordered and reviewed    Patient Progress  Patient progress: stable      Final diagnoses:   Pain of upper abdomen            Asmita Mckeon, APRN  09/14/20 4839

## 2020-09-15 NOTE — DISCHARGE INSTRUCTIONS
Continue current home medications.  Plenty of fluids.  Follow-up your gastroenterologist.  Return for new or worsening symptoms.

## 2021-02-08 ENCOUNTER — APPOINTMENT (OUTPATIENT)
Dept: CT IMAGING | Facility: HOSPITAL | Age: 51
End: 2021-02-08

## 2021-02-08 ENCOUNTER — HOSPITAL ENCOUNTER (EMERGENCY)
Facility: HOSPITAL | Age: 51
Discharge: HOME | End: 2021-02-09
Attending: EMERGENCY MEDICINE | Admitting: EMERGENCY MEDICINE

## 2021-02-08 DIAGNOSIS — R10.84 GENERALIZED ABDOMINAL PAIN: Primary | ICD-10-CM

## 2021-02-08 LAB
ALBUMIN SERPL-MCNC: 4.8 G/DL (ref 3.5–5.2)
ALBUMIN/GLOB SERPL: 1.5 G/DL
ALP SERPL-CCNC: 70 U/L (ref 39–117)
ALT SERPL W P-5'-P-CCNC: 42 U/L (ref 1–41)
ANION GAP SERPL CALCULATED.3IONS-SCNC: 13 MMOL/L (ref 5–15)
AST SERPL-CCNC: 44 U/L (ref 1–40)
BACTERIA UR QL AUTO: ABNORMAL /HPF
BASOPHILS # BLD AUTO: 0.1 10*3/MM3 (ref 0–0.2)
BASOPHILS NFR BLD AUTO: 1.1 % (ref 0–1.5)
BILIRUB SERPL-MCNC: 0.3 MG/DL (ref 0–1.2)
BILIRUB UR QL STRIP: NEGATIVE
BUN SERPL-MCNC: 18 MG/DL (ref 6–20)
BUN/CREAT SERPL: 18.6 (ref 7–25)
CALCIUM SPEC-SCNC: 9.6 MG/DL (ref 8.6–10.5)
CHLORIDE SERPL-SCNC: 103 MMOL/L (ref 98–107)
CLARITY UR: CLEAR
CO2 SERPL-SCNC: 21 MMOL/L (ref 22–29)
COLOR UR: YELLOW
CREAT SERPL-MCNC: 0.97 MG/DL (ref 0.76–1.27)
DEPRECATED RDW RBC AUTO: 50.3 FL (ref 37–54)
EOSINOPHIL # BLD AUTO: 0.2 10*3/MM3 (ref 0–0.4)
EOSINOPHIL NFR BLD AUTO: 1.7 % (ref 0.3–6.2)
ERYTHROCYTE [DISTWIDTH] IN BLOOD BY AUTOMATED COUNT: 14.9 % (ref 12.3–15.4)
GFR SERPL CREATININE-BSD FRML MDRD: 82 ML/MIN/1.73
GLOBULIN UR ELPH-MCNC: 3.1 GM/DL
GLUCOSE SERPL-MCNC: 117 MG/DL (ref 65–99)
GLUCOSE UR STRIP-MCNC: NEGATIVE MG/DL
HCT VFR BLD AUTO: 44.5 % (ref 37.5–51)
HGB BLD-MCNC: 15.2 G/DL (ref 13–17.7)
HGB UR QL STRIP.AUTO: ABNORMAL
HYALINE CASTS UR QL AUTO: ABNORMAL /LPF
KETONES UR QL STRIP: NEGATIVE
LEUKOCYTE ESTERASE UR QL STRIP.AUTO: NEGATIVE
LIPASE SERPL-CCNC: 53 U/L (ref 13–60)
LYMPHOCYTES # BLD AUTO: 4.5 10*3/MM3 (ref 0.7–3.1)
LYMPHOCYTES NFR BLD AUTO: 36.3 % (ref 19.6–45.3)
MCH RBC QN AUTO: 33.3 PG (ref 26.6–33)
MCHC RBC AUTO-ENTMCNC: 34.2 G/DL (ref 31.5–35.7)
MCV RBC AUTO: 97.3 FL (ref 79–97)
MONOCYTES # BLD AUTO: 0.9 10*3/MM3 (ref 0.1–0.9)
MONOCYTES NFR BLD AUTO: 6.9 % (ref 5–12)
NEUTROPHILS NFR BLD AUTO: 54 % (ref 42.7–76)
NEUTROPHILS NFR BLD AUTO: 6.7 10*3/MM3 (ref 1.7–7)
NITRITE UR QL STRIP: NEGATIVE
NRBC BLD AUTO-RTO: 0.1 /100 WBC (ref 0–0.2)
PH UR STRIP.AUTO: 6 [PH] (ref 5–8)
PLATELET # BLD AUTO: 208 10*3/MM3 (ref 140–450)
PMV BLD AUTO: 9.4 FL (ref 6–12)
POTASSIUM SERPL-SCNC: 4 MMOL/L (ref 3.5–5.2)
PROT SERPL-MCNC: 7.9 G/DL (ref 6–8.5)
PROT UR QL STRIP: NEGATIVE
RBC # BLD AUTO: 4.57 10*6/MM3 (ref 4.14–5.8)
RBC # UR: ABNORMAL /HPF
REF LAB TEST METHOD: ABNORMAL
SODIUM SERPL-SCNC: 137 MMOL/L (ref 136–145)
SP GR UR STRIP: 1.02 (ref 1–1.03)
SQUAMOUS #/AREA URNS HPF: ABNORMAL /HPF
UROBILINOGEN UR QL STRIP: ABNORMAL
WBC # BLD AUTO: 12.4 10*3/MM3 (ref 3.4–10.8)
WBC UR QL AUTO: ABNORMAL /HPF

## 2021-02-08 PROCEDURE — 96361 HYDRATE IV INFUSION ADD-ON: CPT

## 2021-02-08 PROCEDURE — 99283 EMERGENCY DEPT VISIT LOW MDM: CPT

## 2021-02-08 PROCEDURE — 96374 THER/PROPH/DIAG INJ IV PUSH: CPT

## 2021-02-08 PROCEDURE — 74177 CT ABD & PELVIS W/CONTRAST: CPT

## 2021-02-08 PROCEDURE — 81001 URINALYSIS AUTO W/SCOPE: CPT | Performed by: EMERGENCY MEDICINE

## 2021-02-08 PROCEDURE — 85025 COMPLETE CBC W/AUTO DIFF WBC: CPT | Performed by: EMERGENCY MEDICINE

## 2021-02-08 PROCEDURE — 83690 ASSAY OF LIPASE: CPT | Performed by: EMERGENCY MEDICINE

## 2021-02-08 PROCEDURE — 25010000002 ONDANSETRON PER 1 MG: Performed by: EMERGENCY MEDICINE

## 2021-02-08 PROCEDURE — 96375 TX/PRO/DX INJ NEW DRUG ADDON: CPT

## 2021-02-08 PROCEDURE — 80053 COMPREHEN METABOLIC PANEL: CPT | Performed by: EMERGENCY MEDICINE

## 2021-02-08 PROCEDURE — 25010000002 HYDROMORPHONE PER 4 MG: Performed by: EMERGENCY MEDICINE

## 2021-02-08 RX ORDER — HYDROMORPHONE HCL 110MG/55ML
1 PATIENT CONTROLLED ANALGESIA SYRINGE INTRAVENOUS ONCE
Status: COMPLETED | OUTPATIENT
Start: 2021-02-08 | End: 2021-02-08

## 2021-02-08 RX ORDER — SODIUM CHLORIDE, SODIUM LACTATE, POTASSIUM CHLORIDE, CALCIUM CHLORIDE 600; 310; 30; 20 MG/100ML; MG/100ML; MG/100ML; MG/100ML
125 INJECTION, SOLUTION INTRAVENOUS CONTINUOUS
Status: DISCONTINUED | OUTPATIENT
Start: 2021-02-08 | End: 2021-02-09 | Stop reason: HOSPADM

## 2021-02-08 RX ORDER — ONDANSETRON 2 MG/ML
4 INJECTION INTRAMUSCULAR; INTRAVENOUS ONCE
Status: COMPLETED | OUTPATIENT
Start: 2021-02-08 | End: 2021-02-08

## 2021-02-08 RX ORDER — HYDROXYZINE PAMOATE 25 MG/1
25 CAPSULE ORAL DAILY
COMMUNITY

## 2021-02-08 RX ADMIN — SODIUM CHLORIDE, POTASSIUM CHLORIDE, SODIUM LACTATE AND CALCIUM CHLORIDE 125 ML/HR: 600; 310; 30; 20 INJECTION, SOLUTION INTRAVENOUS at 22:55

## 2021-02-08 RX ADMIN — ONDANSETRON 4 MG: 2 INJECTION, SOLUTION INTRAMUSCULAR; INTRAVENOUS at 22:55

## 2021-02-08 RX ADMIN — HYDROMORPHONE HYDROCHLORIDE 1 MG: 2 INJECTION, SOLUTION INTRAMUSCULAR; INTRAVENOUS; SUBCUTANEOUS at 22:54

## 2021-02-09 VITALS
WEIGHT: 256.62 LBS | SYSTOLIC BLOOD PRESSURE: 115 MMHG | RESPIRATION RATE: 14 BRPM | HEART RATE: 87 BPM | BODY MASS INDEX: 32.93 KG/M2 | OXYGEN SATURATION: 99 % | TEMPERATURE: 98.4 F | DIASTOLIC BLOOD PRESSURE: 79 MMHG | HEIGHT: 74 IN

## 2021-02-09 PROCEDURE — 96361 HYDRATE IV INFUSION ADD-ON: CPT

## 2021-02-09 PROCEDURE — 25010000002 DROPERIDOL PER 5 MG: Performed by: EMERGENCY MEDICINE

## 2021-02-09 PROCEDURE — 0 IOPAMIDOL PER 1 ML: Performed by: EMERGENCY MEDICINE

## 2021-02-09 PROCEDURE — 25010000002 DIPHENHYDRAMINE PER 50 MG: Performed by: EMERGENCY MEDICINE

## 2021-02-09 PROCEDURE — 96375 TX/PRO/DX INJ NEW DRUG ADDON: CPT

## 2021-02-09 RX ORDER — DIPHENHYDRAMINE HYDROCHLORIDE 50 MG/ML
25 INJECTION INTRAMUSCULAR; INTRAVENOUS ONCE
Status: COMPLETED | OUTPATIENT
Start: 2021-02-09 | End: 2021-02-09

## 2021-02-09 RX ORDER — DROPERIDOL 2.5 MG/ML
1.25 INJECTION, SOLUTION INTRAMUSCULAR; INTRAVENOUS ONCE
Status: COMPLETED | OUTPATIENT
Start: 2021-02-09 | End: 2021-02-09

## 2021-02-09 RX ORDER — PROCHLORPERAZINE MALEATE 10 MG
10 TABLET ORAL EVERY 6 HOURS PRN
Qty: 12 TABLET | Refills: 0 | Status: SHIPPED | OUTPATIENT
Start: 2021-02-09

## 2021-02-09 RX ADMIN — DROPERIDOL 1.25 MG: 2.5 INJECTION, SOLUTION INTRAMUSCULAR; INTRAVENOUS at 00:36

## 2021-02-09 RX ADMIN — IOPAMIDOL 100 ML: 755 INJECTION, SOLUTION INTRAVENOUS at 00:14

## 2021-02-09 RX ADMIN — DIPHENHYDRAMINE HYDROCHLORIDE 25 MG: 50 INJECTION INTRAMUSCULAR; INTRAVENOUS at 00:35

## 2021-02-09 RX ADMIN — FAMOTIDINE 20 MG: 10 INJECTION INTRAVENOUS at 00:35

## 2021-02-09 NOTE — ED PROVIDER NOTES
Subjective   History of Present Illness  Context: 50-year-old male with history of chronic pancreatitis presents abdominal pain.  He states he started with pain a few days ago became more severe tonight.  He has been bloated feeling.  He has had nausea but no vomiting.  He has had some diarrhea last 3 to 4 days.  Has had a 2 or 3 times today.  He has had no fever.  No cough or shortness of breath.  Has had no urinary difficulty.  He reports history of alcoholism.  He states he has been sober 7 years.  He does report some radiation to his back.  Complains he is having some low abdominal pain which is not typical for him.  Location: Abdomen  Quality: Pain  Duration: few days  Timing: Constant  Severity: Has become more severe tonight   Modifying factors: Is on Creon for chronic pancreatitis  Associated signs and symptoms: Nausea, diarrhea    Review of Systems   Constitutional: Negative for fever and unexpected weight change.   HENT: Negative for congestion and sore throat.    Eyes: Negative for pain and visual disturbance.   Respiratory: Negative for cough and shortness of breath.    Cardiovascular: Negative for chest pain and leg swelling.   Gastrointestinal: Positive for abdominal distention, abdominal pain, diarrhea and nausea. Negative for vomiting.   Genitourinary: Negative for dysuria and urgency.   Musculoskeletal: Positive for back pain.   Skin: Negative for rash.   Neurological: Negative for dizziness, weakness and headaches.   Psychiatric/Behavioral: Negative for confusion.       Past Medical History:   Diagnosis Date   • History of anal fissures    • hypothyroidism    • Pancreatitis    • Substance abuse (CMS/[facility])        Allergies   Allergen Reactions   • Penicillins Anaphylaxis   • Ibuprofen GI Bleeding   • Cyclobenzaprine Rash       Past Surgical History:   Procedure Laterality Date   • KNEE ACL RECONSTRUCTION     • NECK SURGERY     • NECK SURGERY  2012    C6-C7       No family history on file.    Social  History     Socioeconomic History   • Marital status:      Spouse name: Not on file   • Number of children: Not on file   • Years of education: Not on file   • Highest education level: Not on file   Tobacco Use   • Smoking status: Current Every Day Smoker     Packs/day: 1.00     Years: 30.00     Pack years: 30.00     Types: Cigarettes   • Smokeless tobacco: Never Used   Substance and Sexual Activity   • Alcohol use: No     Frequency: Never     Comment: Previous Daily/Binge drinker x 25 years, sober for 5   • Drug use: No   • Sexual activity: Defer     Prior to Admission medications    Medication Sig Start Date End Date Taking? Authorizing Provider   busPIRone (BUSPAR) 15 MG tablet Take 7.5 mg by mouth Every Morning.    ProviderJuan Carlos MD   gabapentin (NEURONTIN) 800 MG tablet Take 800 mg by mouth 3 (Three) Times a Day.    Juan Carlos Veronica MD   HYDROcodone-acetaminophen (NORCO)  MG per tablet Take 1 tablet by mouth Every 6 (Six) Hours As Needed for Moderate Pain  for up to 12 doses. 6/17/20   Wyatt Shea MD   levothyroxine (SYNTHROID, LEVOTHROID) 100 MCG tablet Take 125 mcg by mouth Daily.    Juan Carlos Veronica MD   lisinopril (PRINIVIL,ZESTRIL) 10 MG tablet Take 10 mg by mouth Daily.    ProviderJuan Carlos MD   pancrelipase, Lip-Prot-Amyl, (CREON) 30177-31491 units capsule delayed-release particles capsule Take 24,000 units of lipase by mouth 3 (Three) Times a Day With Meals.    Juan Carlos Veronica MD   Pramoxine HCl, Perianal, (Proctofoam) 1 % foam Insert  into the rectum Every 2 (Two) Hours As Needed for Irritation. 9/14/20   Asmita Mckeon APRN   tamsulosin (FLOMAX) 0.4 MG capsule 24 hr capsule Take 1 capsule by mouth Every Night. 6/13/20   Mona Hendrix MD           Objective   Physical Exam  50-year-old male awake alert.  Generally overweight.  Pupils equal round at light.  Oropharynx mucous membranes moist neck supple chest clear equal breath sounds.   Cardiovascular regular rate and rhythm.  Abdomen soft positive bowel sounds some mild generalized tenderness.  He also reports some left lower abdominal tenderness which is not typical for him.  Skin without rash noted.  Neurologic exam without focal findings noted.  Legs without tenderness or edema.  Procedures           ED Course      Results for orders placed or performed during the hospital encounter of 02/08/21   Comprehensive Metabolic Panel    Specimen: Blood   Result Value Ref Range    Glucose 117 (H) 65 - 99 mg/dL    BUN 18 6 - 20 mg/dL    Creatinine 0.97 0.76 - 1.27 mg/dL    Sodium 137 136 - 145 mmol/L    Potassium 4.0 3.5 - 5.2 mmol/L    Chloride 103 98 - 107 mmol/L    CO2 21.0 (L) 22.0 - 29.0 mmol/L    Calcium 9.6 8.6 - 10.5 mg/dL    Total Protein 7.9 6.0 - 8.5 g/dL    Albumin 4.80 3.50 - 5.20 g/dL    ALT (SGPT) 42 (H) 1 - 41 U/L    AST (SGOT) 44 (H) 1 - 40 U/L    Alkaline Phosphatase 70 39 - 117 U/L    Total Bilirubin 0.3 0.0 - 1.2 mg/dL    eGFR Non African Amer 82 >60 mL/min/1.73    Globulin 3.1 gm/dL    A/G Ratio 1.5 g/dL    BUN/Creatinine Ratio 18.6 7.0 - 25.0    Anion Gap 13.0 5.0 - 15.0 mmol/L   Lipase    Specimen: Blood   Result Value Ref Range    Lipase 53 13 - 60 U/L   CBC Auto Differential    Specimen: Blood   Result Value Ref Range    WBC 12.40 (H) 3.40 - 10.80 10*3/mm3    RBC 4.57 4.14 - 5.80 10*6/mm3    Hemoglobin 15.2 13.0 - 17.7 g/dL    Hematocrit 44.5 37.5 - 51.0 %    MCV 97.3 (H) 79.0 - 97.0 fL    MCH 33.3 (H) 26.6 - 33.0 pg    MCHC 34.2 31.5 - 35.7 g/dL    RDW 14.9 12.3 - 15.4 %    RDW-SD 50.3 37.0 - 54.0 fl    MPV 9.4 6.0 - 12.0 fL    Platelets 208 140 - 450 10*3/mm3    Neutrophil % 54.0 42.7 - 76.0 %    Lymphocyte % 36.3 19.6 - 45.3 %    Monocyte % 6.9 5.0 - 12.0 %    Eosinophil % 1.7 0.3 - 6.2 %    Basophil % 1.1 0.0 - 1.5 %    Neutrophils, Absolute 6.70 1.70 - 7.00 10*3/mm3    Lymphocytes, Absolute 4.50 (H) 0.70 - 3.10 10*3/mm3    Monocytes, Absolute 0.90 0.10 - 0.90 10*3/mm3     "Eosinophils, Absolute 0.20 0.00 - 0.40 10*3/mm3    Basophils, Absolute 0.10 0.00 - 0.20 10*3/mm3    nRBC 0.1 0.0 - 0.2 /100 WBC   Urinalysis With Microscopic If Indicated (No Culture) - Urine, Clean Catch    Specimen: Urine, Clean Catch   Result Value Ref Range    Color, UA Yellow Yellow, Straw    Appearance, UA Clear Clear    pH, UA 6.0 5.0 - 8.0    Specific Gravity, UA 1.023 1.005 - 1.030    Glucose, UA Negative Negative    Ketones, UA Negative Negative    Bilirubin, UA Negative Negative    Blood, UA Small (1+) (A) Negative    Protein, UA Negative Negative    Leuk Esterase, UA Negative Negative    Nitrite, UA Negative Negative    Urobilinogen, UA 0.2 E.U./dL 0.2 - 1.0 E.U./dL   Urinalysis, Microscopic Only - Urine, Clean Catch    Specimen: Urine, Clean Catch   Result Value Ref Range    RBC, UA 3-5 (A) None Seen /HPF    WBC, UA 0-2 (A) None Seen /HPF    Bacteria, UA None Seen None Seen /HPF    Squamous Epithelial Cells, UA None Seen None Seen, 0-2 /HPF    Hyaline Casts, UA 0-2 None Seen /LPF    Methodology Automated Microscopy      No radiology results for the last day  Medications   lactated ringers infusion (125 mL/hr Intravenous New Bag 2/8/21 2255)   HYDROmorphone (DILAUDID) injection 1 mg (1 mg Intravenous Given 2/8/21 2254)   ondansetron (ZOFRAN) injection 4 mg (4 mg Intravenous Given 2/8/21 2255)   iopamidol (ISOVUE-370) 76 % injection 100 mL (100 mL Intravenous Given 2/9/21 0014)   droperidol (INAPSINE) injection 1.25 mg (1.25 mg Intravenous Given 2/9/21 0036)   diphenhydrAMINE (BENADRYL) injection 25 mg (25 mg Intravenous Given 2/9/21 0035)   famotidine (PEPCID) injection 20 mg (20 mg Intravenous Given 2/9/21 0035)     /72   Pulse 97   Temp 98.1 °F (36.7 °C) (Oral)   Resp 18   Ht 188 cm (74\")   Wt 116 kg (256 lb 9.9 oz)   SpO2 96%   BMI 32.95 kg/m²                                        [facility]  Chart review: Patient had admission in June of last year for acute on chronic " pancreatitis.  Comorbidity: As per past history  Differential: Pancreatitis, pseudocyst, gastritis, diverticulitis,  My EKG interpretation:   Lab: Lipase 53, urinalysis 3-5 red cells 0-2 white cells no bacteria White count 12.4 with hemoglobin 15.2 platelet count of 208 with 54 segs 36 lymphs no bands, comprehensive metabolic panel ALT 42 AST 44 glucose 117  Radiology: I reviewed CT scan of abdomen pelvis with IV contrast.  There is hepatic steatosis there is no acute intra-abdominal or pelvic abnormality noted.  There is a low-density circumscribed lesion in the stomach this is unchanged from previous.  Discussion/treatment: Patient IV placed.  He was initially given Dilaudid and Zofran.  He was still complained of pain and was given Pepcid Inapsine and Benadryl.  His findings were discussed with him.  He will be discharged.  Advised to follow-up with his gastroenterologist call in a.m., return new or worsening symptoms.  He was advised to use Pepcid for possible gastritis.  He was given compazine for nausea.  Patient reports he is already taking medication for reflux  Patient was evaluated using appropriate PPE        Final diagnoses:   Generalized abdominal pain            John Benton MD  02/09/21 0054

## 2021-09-14 ENCOUNTER — APPOINTMENT (OUTPATIENT)
Dept: CT IMAGING | Facility: HOSPITAL | Age: 51
End: 2021-09-14

## 2021-09-14 ENCOUNTER — HOSPITAL ENCOUNTER (EMERGENCY)
Facility: HOSPITAL | Age: 51
Discharge: HOME | End: 2021-09-14
Attending: EMERGENCY MEDICINE | Admitting: EMERGENCY MEDICINE

## 2021-09-14 VITALS
BODY MASS INDEX: 32.76 KG/M2 | WEIGHT: 241.84 LBS | SYSTOLIC BLOOD PRESSURE: 150 MMHG | OXYGEN SATURATION: 93 % | DIASTOLIC BLOOD PRESSURE: 94 MMHG | HEIGHT: 72 IN | TEMPERATURE: 97.5 F | HEART RATE: 62 BPM | RESPIRATION RATE: 16 BRPM

## 2021-09-14 DIAGNOSIS — R10.9 FLANK PAIN: Primary | ICD-10-CM

## 2021-09-14 LAB
ALBUMIN SERPL-MCNC: 4.6 G/DL (ref 3.5–5.2)
ALBUMIN/GLOB SERPL: 1.8 G/DL
ALP SERPL-CCNC: 76 U/L (ref 39–117)
ALT SERPL W P-5'-P-CCNC: 41 U/L (ref 1–41)
ANION GAP SERPL CALCULATED.3IONS-SCNC: 15 MMOL/L (ref 5–15)
AST SERPL-CCNC: 89 U/L (ref 1–40)
BASOPHILS # BLD AUTO: 0.1 10*3/MM3 (ref 0–0.2)
BASOPHILS NFR BLD AUTO: 1.1 % (ref 0–1.5)
BILIRUB SERPL-MCNC: 0.5 MG/DL (ref 0–1.2)
BILIRUB UR QL STRIP: NEGATIVE
BUN SERPL-MCNC: 8 MG/DL (ref 6–20)
BUN/CREAT SERPL: 5.8 (ref 7–25)
CALCIUM SPEC-SCNC: 8.9 MG/DL (ref 8.6–10.5)
CHLORIDE SERPL-SCNC: 97 MMOL/L (ref 98–107)
CLARITY UR: CLEAR
CO2 SERPL-SCNC: 26 MMOL/L (ref 22–29)
COLOR UR: YELLOW
CREAT SERPL-MCNC: 1.38 MG/DL (ref 0.76–1.27)
DEPRECATED RDW RBC AUTO: 50.8 FL (ref 37–54)
EOSINOPHIL # BLD AUTO: 0.2 10*3/MM3 (ref 0–0.4)
EOSINOPHIL NFR BLD AUTO: 3.1 % (ref 0.3–6.2)
ERYTHROCYTE [DISTWIDTH] IN BLOOD BY AUTOMATED COUNT: 15.1 % (ref 12.3–15.4)
GFR SERPL CREATININE-BSD FRML MDRD: 54 ML/MIN/1.73
GLOBULIN UR ELPH-MCNC: 2.5 GM/DL
GLUCOSE SERPL-MCNC: 135 MG/DL (ref 65–99)
GLUCOSE UR STRIP-MCNC: NEGATIVE MG/DL
HCT VFR BLD AUTO: 46.6 % (ref 37.5–51)
HGB BLD-MCNC: 16 G/DL (ref 13–17.7)
HGB UR QL STRIP.AUTO: NEGATIVE
KETONES UR QL STRIP: ABNORMAL
LEUKOCYTE ESTERASE UR QL STRIP.AUTO: NEGATIVE
LIPASE SERPL-CCNC: 51 U/L (ref 13–60)
LYMPHOCYTES # BLD AUTO: 3.3 10*3/MM3 (ref 0.7–3.1)
LYMPHOCYTES NFR BLD AUTO: 40.3 % (ref 19.6–45.3)
MCH RBC QN AUTO: 33.1 PG (ref 26.6–33)
MCHC RBC AUTO-ENTMCNC: 34.2 G/DL (ref 31.5–35.7)
MCV RBC AUTO: 96.7 FL (ref 79–97)
MONOCYTES # BLD AUTO: 0.3 10*3/MM3 (ref 0.1–0.9)
MONOCYTES NFR BLD AUTO: 3.9 % (ref 5–12)
NEUTROPHILS NFR BLD AUTO: 4.2 10*3/MM3 (ref 1.7–7)
NEUTROPHILS NFR BLD AUTO: 51.6 % (ref 42.7–76)
NITRITE UR QL STRIP: NEGATIVE
NRBC BLD AUTO-RTO: 0.2 /100 WBC (ref 0–0.2)
PH UR STRIP.AUTO: 6.5 [PH] (ref 5–8)
PLATELET # BLD AUTO: 260 10*3/MM3 (ref 140–450)
PMV BLD AUTO: 8.4 FL (ref 6–12)
POTASSIUM SERPL-SCNC: 3.5 MMOL/L (ref 3.5–5.2)
PROT SERPL-MCNC: 7.1 G/DL (ref 6–8.5)
PROT UR QL STRIP: ABNORMAL
RBC # BLD AUTO: 4.82 10*6/MM3 (ref 4.14–5.8)
SODIUM SERPL-SCNC: 138 MMOL/L (ref 136–145)
SP GR UR STRIP: 1.01 (ref 1–1.03)
UROBILINOGEN UR QL STRIP: ABNORMAL
WBC # BLD AUTO: 8.1 10*3/MM3 (ref 3.4–10.8)

## 2021-09-14 PROCEDURE — 96374 THER/PROPH/DIAG INJ IV PUSH: CPT

## 2021-09-14 PROCEDURE — 80053 COMPREHEN METABOLIC PANEL: CPT | Performed by: EMERGENCY MEDICINE

## 2021-09-14 PROCEDURE — 25010000002 HYDROMORPHONE PER 4 MG: Performed by: EMERGENCY MEDICINE

## 2021-09-14 PROCEDURE — 83690 ASSAY OF LIPASE: CPT | Performed by: EMERGENCY MEDICINE

## 2021-09-14 PROCEDURE — 25010000002 ONDANSETRON PER 1 MG: Performed by: EMERGENCY MEDICINE

## 2021-09-14 PROCEDURE — 85025 COMPLETE CBC W/AUTO DIFF WBC: CPT | Performed by: EMERGENCY MEDICINE

## 2021-09-14 PROCEDURE — 74176 CT ABD & PELVIS W/O CONTRAST: CPT

## 2021-09-14 PROCEDURE — 96375 TX/PRO/DX INJ NEW DRUG ADDON: CPT

## 2021-09-14 PROCEDURE — 81003 URINALYSIS AUTO W/O SCOPE: CPT | Performed by: EMERGENCY MEDICINE

## 2021-09-14 PROCEDURE — 99283 EMERGENCY DEPT VISIT LOW MDM: CPT

## 2021-09-14 RX ORDER — ONDANSETRON 2 MG/ML
4 INJECTION INTRAMUSCULAR; INTRAVENOUS ONCE
Status: COMPLETED | OUTPATIENT
Start: 2021-09-14 | End: 2021-09-14

## 2021-09-14 RX ORDER — SODIUM CHLORIDE 0.9 % (FLUSH) 0.9 %
10 SYRINGE (ML) INJECTION AS NEEDED
Status: DISCONTINUED | OUTPATIENT
Start: 2021-09-14 | End: 2021-09-14 | Stop reason: HOSPADM

## 2021-09-14 RX ORDER — HYDROMORPHONE HCL 110MG/55ML
0.5 PATIENT CONTROLLED ANALGESIA SYRINGE INTRAVENOUS ONCE
Status: COMPLETED | OUTPATIENT
Start: 2021-09-14 | End: 2021-09-14

## 2021-09-14 RX ADMIN — SODIUM CHLORIDE 500 ML: 9 INJECTION, SOLUTION INTRAVENOUS at 14:17

## 2021-09-14 RX ADMIN — HYDROMORPHONE HYDROCHLORIDE 0.5 MG: 2 INJECTION, SOLUTION INTRAMUSCULAR; INTRAVENOUS; SUBCUTANEOUS at 14:18

## 2021-09-14 RX ADMIN — ONDANSETRON 4 MG: 2 INJECTION INTRAMUSCULAR; INTRAVENOUS at 14:17

## 2021-09-14 NOTE — ED NOTES
Patient has right sided flank pain. Reports difficulty urinating x 4 days. Pain worse today     Mariela Rosario RN  09/14/21 0766

## 2021-09-14 NOTE — ED PROVIDER NOTES
Subjective   History of Present Illness  Right flank pain  51-year-old male complains severe pain right flank while he was walking today.  He reports some nausea.  Reports no vomiting or fevers or chills or trauma.  He reports no numbness or weakness in extremities or dysuria or hematuria.  Review of Systems   Constitutional: Negative.    HENT: Negative.    Eyes: Negative.    Respiratory: Negative.    Cardiovascular: Negative.    Gastrointestinal: Positive for nausea. Negative for vomiting.   Genitourinary: Positive for flank pain.   Musculoskeletal: Positive for back pain.   Skin: Negative.    Neurological: Negative.    Psychiatric/Behavioral: Negative.        Past Medical History:   Diagnosis Date   • History of anal fissures    • hypothyroidism    • Pancreatitis    • Substance abuse (CMS/[facility])        Allergies   Allergen Reactions   • Penicillins Anaphylaxis   • Ibuprofen GI Bleeding   • Cyclobenzaprine Rash       Past Surgical History:   Procedure Laterality Date   • KNEE ACL RECONSTRUCTION     • NECK SURGERY     • NECK SURGERY  2012    C6-C7       History reviewed. No pertinent family history.    Social History     Socioeconomic History   • Marital status:      Spouse name: Not on file   • Number of children: Not on file   • Years of education: Not on file   • Highest education level: Not on file   Tobacco Use   • Smoking status: Current Every Day Smoker     Packs/day: 1.00     Years: 30.00     Pack years: 30.00     Types: Cigarettes   • Smokeless tobacco: Never Used   Substance and Sexual Activity   • Alcohol use: No     Comment: Previous Daily/Binge drinker x 25 years, sober for 5   • Drug use: No   • Sexual activity: Defer     Prior to Admission medications    Medication Sig Start Date End Date Taking? Authorizing Provider   busPIRone (BUSPAR) 15 MG tablet Take 7.5 mg by mouth 2 (Two) Times a Day.    Provider, MD Juan Carlos   gabapentin (NEURONTIN) 800 MG tablet Take 800 mg by mouth 3 (Three) Times a  "Day.    Juan Carlos Veronica MD   HYDROcodone-acetaminophen (NORCO)  MG per tablet Take 1 tablet by mouth Every 6 (Six) Hours As Needed for Moderate Pain  for up to 12 doses. 6/17/20   Wyatt Shea MD   hydrOXYzine pamoate (VISTARIL) 25 MG capsule Take 25 mg by mouth Daily.    Juan Carlos Veronica MD   levothyroxine (SYNTHROID, LEVOTHROID) 100 MCG tablet Take 125 mcg by mouth Daily.    Juan Carlos Veronica MD   lisinopril (PRINIVIL,ZESTRIL) 10 MG tablet Take 10 mg by mouth Daily.    Juan Carlos Veronica MD   pancrelipase, Lip-Prot-Amyl, (CREON) 71006-72357 units capsule delayed-release particles capsule Take 24,000 units of lipase by mouth 3 (Three) Times a Day With Meals.    Juan Carlos Veronica MD   Pramoxine HCl, Perianal, (Proctofoam) 1 % foam Insert  into the rectum Every 2 (Two) Hours As Needed for Irritation. 9/14/20   Asmita Mckeon APRN   prochlorperazine (COMPAZINE) 10 MG tablet Take 1 tablet by mouth Every 6 (Six) Hours As Needed for Nausea or Vomiting. 2/9/21   John Benton MD   tamsulosin (FLOMAX) 0.4 MG capsule 24 hr capsule Take 1 capsule by mouth Every Night. 6/13/20   Mona Hendrix MD     /94   Pulse 63   Temp 97.3 °F (36.3 °C)   Resp 17   Ht 182.9 cm (72\")   Wt 110 kg (241 lb 13.5 oz)   SpO2 93%   BMI 32.80 kg/m²   I examined the patient using the appropriate personal protective equipment.          Objective   Physical Exam  General: Well-developed well-appearing, mild distress secondary to pain  Eyes:  sclera nonicteric  HEENT: Mucous membranes moist, no mucosal swelling  Neck: Supple, no nuchal rigidity,   Respirations: Respirations nonlabored, equal breath sounds bilaterally, clear lungs  Heart regular rate and rhythm, no murmurs rubs or gallops,   Abdomen soft nontender nondistended, no hepatosplenomegaly, no hernia, no mass, normal bowel sounds, some right flank tenderness, no soft tissue swelling, no erythema, no midline back tenderness  Extremities " no clubbing cyanosis or edema, calves are symmetric and nontender  Neuro cranial nerves grossly intact, no focal limb deficits  Psych oriented, pleasant affect  Skin no rash, brisk cap refill  Procedures           ED Course            Results for orders placed or performed during the hospital encounter of 09/14/21   Comprehensive Metabolic Panel    Specimen: Blood   Result Value Ref Range    Glucose 135 (H) 65 - 99 mg/dL    BUN 8 6 - 20 mg/dL    Creatinine 1.38 (H) 0.76 - 1.27 mg/dL    Sodium 138 136 - 145 mmol/L    Potassium 3.5 3.5 - 5.2 mmol/L    Chloride 97 (L) 98 - 107 mmol/L    CO2 26.0 22.0 - 29.0 mmol/L    Calcium 8.9 8.6 - 10.5 mg/dL    Total Protein 7.1 6.0 - 8.5 g/dL    Albumin 4.60 3.50 - 5.20 g/dL    ALT (SGPT) 41 1 - 41 U/L    AST (SGOT) 89 (H) 1 - 40 U/L    Alkaline Phosphatase 76 39 - 117 U/L    Total Bilirubin 0.5 0.0 - 1.2 mg/dL    eGFR Non African Amer 54 (L) >60 mL/min/1.73    Globulin 2.5 gm/dL    A/G Ratio 1.8 g/dL    BUN/Creatinine Ratio 5.8 (L) 7.0 - 25.0    Anion Gap 15.0 5.0 - 15.0 mmol/L   Lipase    Specimen: Blood   Result Value Ref Range    Lipase 51 13 - 60 U/L   CBC Auto Differential    Specimen: Blood   Result Value Ref Range    WBC 8.10 3.40 - 10.80 10*3/mm3    RBC 4.82 4.14 - 5.80 10*6/mm3    Hemoglobin 16.0 13.0 - 17.7 g/dL    Hematocrit 46.6 37.5 - 51.0 %    MCV 96.7 79.0 - 97.0 fL    MCH 33.1 (H) 26.6 - 33.0 pg    MCHC 34.2 31.5 - 35.7 g/dL    RDW 15.1 12.3 - 15.4 %    RDW-SD 50.8 37.0 - 54.0 fl    MPV 8.4 6.0 - 12.0 fL    Platelets 260 140 - 450 10*3/mm3    Neutrophil % 51.6 42.7 - 76.0 %    Lymphocyte % 40.3 19.6 - 45.3 %    Monocyte % 3.9 (L) 5.0 - 12.0 %    Eosinophil % 3.1 0.3 - 6.2 %    Basophil % 1.1 0.0 - 1.5 %    Neutrophils, Absolute 4.20 1.70 - 7.00 10*3/mm3    Lymphocytes, Absolute 3.30 (H) 0.70 - 3.10 10*3/mm3    Monocytes, Absolute 0.30 0.10 - 0.90 10*3/mm3    Eosinophils, Absolute 0.20 0.00 - 0.40 10*3/mm3    Basophils, Absolute 0.10 0.00 - 0.20 10*3/mm3    nRBC  0.2 0.0 - 0.2 /100 WBC     CT Abdomen Pelvis Without Contrast    Result Date: 9/14/2021   1. No acute findings in the abdomen or pelvis. No CT explanation for the patient's right flank pain. 2. Hepatomegaly with diffuse hepatic steatosis. 3. Cholecystectomy. 4. Normal-appearing appendix.    Electronically Signed By-Luann De Jesus MD On:9/14/2021 2:35 PM This report was finalized on [id number] by  Luann De Jesus MD.                                      MDM  Interview of the records patient has a history of some renal insufficiency not significantly changed today.  CT scan of the abdomen pelvis shows no obstructive uropathy.  Urinalysis is pending.  Patient's pain is likely from musculoskeletal origin versus urinary tract infection.  If the urinalysis is negative patient will be advised to continue his current pain management regimen and follow-up with his doctor.  If patient has urinary tract infection will be started on oral antibiotic treatment.  Notably patient is afebrile and has no leukocytosis to suggest severe infection.  Final diagnoses:   Flank pain       ED Disposition  ED Disposition     None          No follow-up provider specified.       Medication List      No changes were made to your prescriptions during this visit.          Cody Davenport MD  09/14/21 1828